# Patient Record
Sex: MALE | Race: OTHER
[De-identification: names, ages, dates, MRNs, and addresses within clinical notes are randomized per-mention and may not be internally consistent; named-entity substitution may affect disease eponyms.]

---

## 2020-09-29 ENCOUNTER — APPOINTMENT (OUTPATIENT)
Dept: GASTROENTEROLOGY | Facility: CLINIC | Age: 50
End: 2020-09-29

## 2020-11-24 ENCOUNTER — APPOINTMENT (OUTPATIENT)
Dept: GASTROENTEROLOGY | Facility: CLINIC | Age: 50
End: 2020-11-24
Payer: MEDICAID

## 2020-11-24 DIAGNOSIS — K21.9 GASTRO-ESOPHAGEAL REFLUX DISEASE W/OUT ESOPHAGITIS: ICD-10-CM

## 2020-11-24 DIAGNOSIS — Z78.9 OTHER SPECIFIED HEALTH STATUS: ICD-10-CM

## 2020-11-24 DIAGNOSIS — Z80.42 FAMILY HISTORY OF MALIGNANT NEOPLASM OF PROSTATE: ICD-10-CM

## 2020-11-24 DIAGNOSIS — K62.5 HEMORRHAGE OF ANUS AND RECTUM: ICD-10-CM

## 2020-11-24 DIAGNOSIS — Z12.11 ENCOUNTER FOR SCREENING FOR MALIGNANT NEOPLASM OF COLON: ICD-10-CM

## 2020-11-24 PROCEDURE — 99204 OFFICE O/P NEW MOD 45 MIN: CPT | Mod: 95

## 2020-11-24 RX ORDER — ESOMEPRAZOLE MAGNESIUM 40 MG/1
40 GRANULE, DELAYED RELEASE ORAL
Refills: 0 | Status: ACTIVE | COMMUNITY

## 2020-11-24 NOTE — ASSESSMENT
[FreeTextEntry1] : 50 year old male patient average risk for CRC, presents for CRC screening and reports as well occasional rectal bleeding over the last 1 year, with no changes in bowel habits, or weight loss. \par He reports occasional abdominal pain, chronic GERD for which he is on nexium for 15 years, with recurrence of heartburn if not on PPI for a day. \par \par GERD, never had EGD, on chronic PPI with failed taper trial\par needs EGD\par \par Average risk for CRC\par needs screening colonoscopy\par Risks and benefits discussed with patient.\par

## 2020-11-24 NOTE — PHYSICAL EXAM
[General Appearance - Alert] : alert [Hearing Threshold Finger Rub Not [place]] : hearing was normal [] : no respiratory distress [Oriented To Time, Place, And Person] : oriented to person, place, and time

## 2020-11-24 NOTE — HISTORY OF PRESENT ILLNESS
[Home] : at home, [unfilled] , at the time of the visit. [Medical Office: ([facility])___] : at the medical office located in  [Verbal consent obtained from patient] : the patient, [unfilled] [FreeTextEntry4] : Dilia Piper [de-identified] : 50 year old male patient average risk for CRC, presents for CRC screening and reports as well occasional rectal bleeding over the last 1 year, with no changes in bowel habits, or weight loss. \par He reports occasional abdominal pain, chronic GERD for which he is on nexium for 15 years, with recurrence of heartburn if not on PPI for a day.  Attending Only

## 2021-01-05 ENCOUNTER — OUTPATIENT (OUTPATIENT)
Dept: OUTPATIENT SERVICES | Facility: HOSPITAL | Age: 51
LOS: 1 days | Discharge: HOME | End: 2021-01-05

## 2021-01-05 ENCOUNTER — LABORATORY RESULT (OUTPATIENT)
Age: 51
End: 2021-01-05

## 2021-01-05 DIAGNOSIS — Z11.59 ENCOUNTER FOR SCREENING FOR OTHER VIRAL DISEASES: ICD-10-CM

## 2021-01-07 NOTE — ASU PATIENT PROFILE, ADULT - PSH
Encounter for screening colonoscopy  last colonoscopy approx 6 years ago per pt no significant findings

## 2021-01-08 ENCOUNTER — OUTPATIENT (OUTPATIENT)
Dept: OUTPATIENT SERVICES | Facility: HOSPITAL | Age: 51
LOS: 1 days | Discharge: HOME | End: 2021-01-08
Payer: MEDICAID

## 2021-01-08 ENCOUNTER — RESULT REVIEW (OUTPATIENT)
Age: 51
End: 2021-01-08

## 2021-01-08 ENCOUNTER — TRANSCRIPTION ENCOUNTER (OUTPATIENT)
Age: 51
End: 2021-01-08

## 2021-01-08 VITALS — SYSTOLIC BLOOD PRESSURE: 112 MMHG | RESPIRATION RATE: 17 BRPM | HEART RATE: 77 BPM | DIASTOLIC BLOOD PRESSURE: 68 MMHG

## 2021-01-08 VITALS
RESPIRATION RATE: 18 BRPM | DIASTOLIC BLOOD PRESSURE: 88 MMHG | SYSTOLIC BLOOD PRESSURE: 125 MMHG | HEART RATE: 57 BPM | HEIGHT: 70 IN | WEIGHT: 225.09 LBS | OXYGEN SATURATION: 98 % | TEMPERATURE: 98 F

## 2021-01-08 DIAGNOSIS — Z12.11 ENCOUNTER FOR SCREENING FOR MALIGNANT NEOPLASM OF COLON: Chronic | ICD-10-CM

## 2021-01-08 PROCEDURE — 45380 COLONOSCOPY AND BIOPSY: CPT

## 2021-01-08 PROCEDURE — 88312 SPECIAL STAINS GROUP 1: CPT | Mod: 26

## 2021-01-08 PROCEDURE — 88305 TISSUE EXAM BY PATHOLOGIST: CPT | Mod: 26

## 2021-01-08 PROCEDURE — 43239 EGD BIOPSY SINGLE/MULTIPLE: CPT | Mod: XS

## 2021-01-08 NOTE — H&P PST ADULT - HISTORY OF PRESENT ILLNESS
51 yo male patient average risk CRC presents CRC screening, rectal bleeding for over a year and recurrent GERD unresponsive to PPI Plan EGD/Colonoscopy

## 2021-01-08 NOTE — ASU DISCHARGE PLAN (ADULT/PEDIATRIC) - CALL YOUR DOCTOR IF YOU HAVE ANY OF THE FOLLOWING:
Bleeding that does not stop/Pain not relieved by Medications/Fever greater than (need to indicate Fahrenheit or Celsius)/Nausea and vomiting that does not stop/Excessive diarrhea/Inability to tolerate liquids or foods

## 2021-01-08 NOTE — CHART NOTE - NSCHARTNOTEFT_GEN_A_CORE
PACU ANESTHESIA ADMISSION NOTE      Procedure: EDG, Colonoscopy  Post op diagnosis:  Gastritis    ____  Intubated  TV:______       Rate: ______      FiO2: ______    __x__  Patent Airway    __x__  Full return of protective reflexes    __x__  Full recovery from anesthesia / back to baseline status    Vitals:  T(C): 36.4 (01-08-21 @ 14:28), Max: 36.4 (01-08-21 @ 12:39)  HR: 57 (01-08-21 @ 14:28) (57 - 57)  BP: 125/88 (01-08-21 @ 14:28) (125/88 - 125/88)  RR: 18 (01-08-21 @ 14:28) (18 - 18)  SpO2: 98% (01-08-21 @ 14:28) (98% - 98%)    Mental Status:  __x__ Awake   ___x__ Alert   _____ Drowsy   _____ Sedated    Nausea/Vomiting:  __x__ NO  ______Yes,   See Post - Op Orders          Pain Scale (0-10):  _0____    Treatment: ____ None    __x__ See Post - Op/PCA Orders    Post - Operative Fluids:   ____ Oral   __x__ See Post - Op Orders    Plan: Discharge:   ____Home       _____Floor     _____Critical Care    _____  Other:_________________    Comments: Patient had smooth intraoperative event, no anesthesia complication.  PACU Vital signs: HR:   65         BP:   118     /    78      RR:   18          O2 Sat: 97     %     Temp  36

## 2021-01-08 NOTE — ASU DISCHARGE PLAN (ADULT/PEDIATRIC) - FOLLOW UP APPOINTMENTS
[facility] [place]:  Ambulatory Surgery [facility]… 63 y/o F presents with L sided neck pain and L arm numbness intermittently x 1 week. Will obtain CT head/neck, labs, EKG and reassess.  Stroke not suspected clinically; no indication for tPA, code stroke or telestroke given intermittent numbness and onset 1 week ago.

## 2021-01-08 NOTE — ASU DISCHARGE PLAN (ADULT/PEDIATRIC) - CARE PROVIDER_API CALL
Rayna Ott (MD)  Gastroenterology  [address] [place] [address]  Phone: [phone]  Fax: [phone]  Follow Up Time:

## 2021-01-12 DIAGNOSIS — K62.5 HEMORRHAGE OF ANUS AND RECTUM: ICD-10-CM

## 2021-01-12 DIAGNOSIS — Z91.010 ALLERGY TO PEANUTS: ICD-10-CM

## 2021-01-12 DIAGNOSIS — K29.50 UNSPECIFIED CHRONIC GASTRITIS WITHOUT BLEEDING: ICD-10-CM

## 2021-01-12 DIAGNOSIS — K64.4 RESIDUAL HEMORRHOIDAL SKIN TAGS: ICD-10-CM

## 2021-01-12 DIAGNOSIS — K21.9 GASTRO-ESOPHAGEAL REFLUX DISEASE WITHOUT ESOPHAGITIS: ICD-10-CM

## 2021-01-12 DIAGNOSIS — K63.5 POLYP OF COLON: ICD-10-CM

## 2021-01-12 LAB
SURGICAL PATHOLOGY STUDY: SIGNIFICANT CHANGE UP
SURGICAL PATHOLOGY STUDY: SIGNIFICANT CHANGE UP

## 2021-10-08 PROBLEM — N20.0 CALCULUS OF KIDNEY: Chronic | Status: ACTIVE | Noted: 2021-01-08

## 2021-10-09 ENCOUNTER — EMERGENCY (EMERGENCY)
Facility: HOSPITAL | Age: 51
LOS: 0 days | Discharge: HOME | End: 2021-10-10
Attending: EMERGENCY MEDICINE | Admitting: EMERGENCY MEDICINE
Payer: MEDICAID

## 2021-10-09 VITALS
OXYGEN SATURATION: 97 % | HEIGHT: 70 IN | DIASTOLIC BLOOD PRESSURE: 99 MMHG | TEMPERATURE: 98 F | HEART RATE: 108 BPM | RESPIRATION RATE: 18 BRPM | SYSTOLIC BLOOD PRESSURE: 171 MMHG

## 2021-10-09 DIAGNOSIS — Z23 ENCOUNTER FOR IMMUNIZATION: ICD-10-CM

## 2021-10-09 DIAGNOSIS — Z12.11 ENCOUNTER FOR SCREENING FOR MALIGNANT NEOPLASM OF COLON: Chronic | ICD-10-CM

## 2021-10-09 DIAGNOSIS — Z91.018 ALLERGY TO OTHER FOODS: ICD-10-CM
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
## 2021-10-09 DIAGNOSIS — S67.194A CRUSHING INJURY OF RIGHT RING FINGER, INITIAL ENCOUNTER: ICD-10-CM
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
## 2021-10-09 DIAGNOSIS — Z91.010 ALLERGY TO PEANUTS: ICD-10-CM

## 2021-10-09 PROCEDURE — 12001 RPR S/N/AX/GEN/TRNK 2.5CM/<: CPT

## 2021-10-09 PROCEDURE — 73130 X-RAY EXAM OF HAND: CPT | Mod: 26,RT

## 2021-10-09 PROCEDURE — 99284 EMERGENCY DEPT VISIT MOD MDM: CPT | Mod: 25

## 2021-10-09 RX ORDER — KETOROLAC TROMETHAMINE 30 MG/ML
30 SYRINGE (ML) INJECTION ONCE
Refills: 0 | Status: DISCONTINUED | OUTPATIENT
Start: 2021-10-09 | End: 2021-10-09

## 2021-10-09 RX ORDER — TETANUS TOXOID, REDUCED DIPHTHERIA TOXOID AND ACELLULAR PERTUSSIS VACCINE, ADSORBED 5; 2.5; 8; 8; 2.5 [IU]/.5ML; [IU]/.5ML; UG/.5ML; UG/.5ML; UG/.5ML
0.5 SUSPENSION INTRAMUSCULAR ONCE
Refills: 0 | Status: COMPLETED | OUTPATIENT
Start: 2021-10-09 | End: 2021-10-09

## 2021-10-09 RX ADMIN — Medication 30 MILLIGRAM(S): at 22:40

## 2021-10-09 RX ADMIN — TETANUS TOXOID, REDUCED DIPHTHERIA TOXOID AND ACELLULAR PERTUSSIS VACCINE, ADSORBED 0.5 MILLILITER(S): 5; 2.5; 8; 8; 2.5 SUSPENSION INTRAMUSCULAR at 22:40

## 2021-10-10 RX ORDER — CEPHALEXIN 500 MG
1 CAPSULE ORAL
Qty: 28 | Refills: 0
Start: 2021-10-10 | End: 2021-10-16

## 2021-10-10 NOTE — ED PROVIDER NOTE - OBJECTIVE STATEMENT
49 yo m presenting w crush injury to R 4th finger. Accidentally slammed his finger in car door, +pain and active bleeding from finger. Denies any paresthesias. 51 yo m presenting w crush injury to R 4th finger. Accidentally slammed his finger in car door, +pain and bleeding from finger. Denies any paresthesias.

## 2021-10-10 NOTE — ED ADULT NURSE NOTE - OBJECTIVE STATEMENT
pt complains of right 4th finger injury, states he slammed his hand in car door by accident, able to move finger, denies numbness, tingling, lac to distal 4th in finger mother

## 2021-10-10 NOTE — ED PROVIDER NOTE - CARE PROVIDER_API CALL
Yves Deluna)  Orthopaedic Surgery  [address] [place] [address]  Phone: [phone]  Fax: [phone]  Follow Up Time: 1-3 Days

## 2021-10-10 NOTE — ED PROVIDER NOTE - PATIENT PORTAL LINK FT
You can access the FollowMyHealth Patient Portal offered by [facility] by registering at the following website: [email or web address]. By joining [employer]’s FollowMyHealth portal, you will also be able to view your health information using other applications (apps) compatible with our system.

## 2021-10-10 NOTE — ED PROVIDER NOTE - CLINICAL SUMMARY MEDICAL DECISION MAKING FREE TEXT BOX
pt presenting with crush injury to R 4th finger this evening- accidentally caught his finger in the car door. no numbness/focal weakness, no other acute injuries or complaints. no ac. 2+ equal pulses throughout <2sec capillary refill throughout +partial nail avulsion to R 4th finger, +ttp, +oozing, NVI. +ring on 4th finger. ring removed, finger blocked, irrigated, distal portion of nail removed, nailbed repaired, +tuft fx, wound dressed, finger splinted. Comfortable with discharge and follow-up outpatient, strict return precautions given. Endorses understanding of all of this and aware that they can return at any time for new or concerning symptoms. No further questions or concerns at this time

## 2021-10-10 NOTE — ED PROVIDER NOTE - PHYSICAL EXAMINATION
CONSTITUTIONAL: Well-developed; well-nourished; in no acute distress.   SKIN: warm, dry.  HEAD: Normocephalic; atraumatic.  EYES: PERRL, EOMI, no conjunctival erythema  ENT: No nasal discharge; airway clear. MMM.  MSK: Normal ROM.  No clubbing, cyanosis, or edema.  +partial nail avulsion to 4th digit of R hand w/ oozing of blood, +ttp of finger.  NEURO: Alert, oriented, grossly unremarkable.  PSYCH: Cooperative, appropriate.

## 2021-10-13 ENCOUNTER — LABORATORY RESULT (OUTPATIENT)
Age: 51
End: 2021-10-13

## 2021-10-14 ENCOUNTER — APPOINTMENT (OUTPATIENT)
Dept: UROLOGY | Facility: CLINIC | Age: 51
End: 2021-10-14
Payer: MEDICAID

## 2021-10-14 VITALS
HEART RATE: 75 BPM | TEMPERATURE: 98 F | HEIGHT: 70 IN | SYSTOLIC BLOOD PRESSURE: 145 MMHG | BODY MASS INDEX: 29.63 KG/M2 | WEIGHT: 207 LBS | DIASTOLIC BLOOD PRESSURE: 83 MMHG

## 2021-10-14 DIAGNOSIS — Z87.442 PERSONAL HISTORY OF URINARY CALCULI: ICD-10-CM

## 2021-10-14 PROCEDURE — 99204 OFFICE O/P NEW MOD 45 MIN: CPT

## 2021-10-14 RX ORDER — POLYETHYLENE GLYCOL 3350 AND ELECTROLYTES WITH LEMON FLAVOR 236; 22.74; 6.74; 5.86; 2.97 G/4L; G/4L; G/4L; G/4L; G/4L
236 POWDER, FOR SOLUTION ORAL
Qty: 1 | Refills: 0 | Status: COMPLETED | COMMUNITY
Start: 2020-11-24 | End: 2021-10-14

## 2021-10-14 RX ORDER — ALLOPURINOL 300 MG/1
300 TABLET ORAL
Qty: 30 | Refills: 0 | Status: COMPLETED | COMMUNITY
Start: 2020-11-05 | End: 2021-10-14

## 2021-10-14 NOTE — HISTORY OF PRESENT ILLNESS
[FreeTextEntry1] : Kamron is a 50-year-old male who I had seen back in 2008 give her take a year he had a left-sided stone that was treated with shockwave lithotripsy at that time he had had CAT scan showing other stones they were felt to be uric acid his pH was running between 6 and 7 though in 2001 he had calcium stones and when we did follow-up studies the KUB in May 2008 showed the stone so we went to lithotripsy.  His 24 urine at the time showed 2.2 L and I wanted him to see a nephrologist as diet was horrible with very high calciums even with allopurinol his uric acid was high he also had oxalate high sodium and high chloride.\par \par PTH at the time was 31.3 with a normal serum calcium and we had discussed he is eating too much animal protein along with a lot of green leafy vegetables so between his calcium is protein in his oxalate intake is 2.2 L a day was not enough I still wanted him to see nutritionist and a nephrologist which she did not do.  He had lithotripsy done on July 10 the fragments came back as calcium oxalate he then did not show for a few months there was some issues with his job and insurance he came back in November 2008 with a question of a lower pole right sided stone.  He came back in August 2009 he had lost 25 pounds was drinking a lot of water but the objective studies showed insufficient intake.  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  He was left with a double-J stent that was eventually removed he then did not do much of anything until about 2 months ago he had right-sided pain ended up passing a stone he gave that to his PCP who sent her for analysis and he tells me it came back calcium oxalate.  Saúl is still having occasional right-sided pain was sent a prescription for an ultrasound of the beginning of September but did not yet go decided he would like to see what I have to recommend.  He is single his diet is probably not the best he is not morbidly obese but he is not a slim as he was when I first met him and the question is what can we do what should we do\par \par He has a long-term relationship for 9 years they were living together at some point but they  about 2 years ago but are still getting together to be intimate on a regular basis in the last 2 months his morning erections are not what they used to be and when he is with her though she knows what he likes and she is a good partner his erection is not reliable on the most of the time he could get it up get it and get it off its not great he like to know what else we can do [3] : 3 [Right Flank] : right flank [Gradual] : gradual [___ Month(s) Ago] : [unfilled] month(s) ago [Intermittent] : intermittently [___ x Week] : occur [unfilled] times a week [___ Minute(s)] : last for [unfilled] minute(s) [Mild] : mild [Unchanged] : unchanged [None] : No relieving factors are noted

## 2021-10-14 NOTE — PHYSICAL EXAM
[General Appearance - Well Developed] : well developed [General Appearance - Well Nourished] : well nourished [Normal Appearance] : normal appearance [Well Groomed] : well groomed [General Appearance - In No Acute Distress] : no acute distress [Heart Rate And Rhythm] : Heart rate and rhythm were normal [Edema] : no peripheral edema [Respiration, Rhythm And Depth] : normal respiratory rhythm and effort [Exaggerated Use Of Accessory Muscles For Inspiration] : no accessory muscle use [Abdomen Soft] : soft [Abdomen Tenderness] : non-tender [Abdomen Hernia] : no hernia was discovered [Costovertebral Angle Tenderness] : no ~M costovertebral angle tenderness [Penis Abnormality] : normal circumcised penis [Epididymis] : the epididymides were normal [Testes Tenderness] : no tenderness of the testes [Testes Mass (___cm)] : there were no testicular masses [Normal Station and Gait] : the gait and station were normal for the patient's age [] : no rash [FreeTextEntry1] : Normal deep tendon reflexes [Oriented To Time, Place, And Person] : oriented to person, place, and time [Affect] : the affect was normal [Mood] : the mood was normal [Not Anxious] : not anxious [Inguinal Lymph Nodes Enlarged Bilaterally] : inguinal

## 2021-10-14 NOTE — ASSESSMENT
[FreeTextEntry1] : He has a history of stones numerous times over time with several procedures.  He recently passed a stone still has right renal colic the question is what is he have\par \par Though his primary care doctor recommended a sonogram him to recommend a CT stone protocol instead to that more definitive that would also help us see what the stones are made of if there are any stone site.  That would help plan any treatment it may or may not be necessary\par \par With respect to his erectile dysfunction he has mild testicular atrophy we will get some hormone studies to have to be drawn as a morning draw we will get a PSA as he is 50 and he will come back in for review.  He is very physically active has no cardiac issues so I do not think we need [facility]

## 2021-10-14 NOTE — LETTER BODY
[Dear  ___] : Dear  [unfilled], [Consult Letter:] : I had the pleasure of evaluating your patient, [unfilled]. [Please see my note below.] : Please see my note below. [Consult Closing:] : Thank you very much for allowing me to participate in the care of this patient.  If you have any questions, please do not hesitate to contact me. [Sincerely,] : Sincerely, [FreeTextEntry2] : Prince Hoyt, DO\par [address]\[place] [id number]

## 2021-10-14 NOTE — LETTER HEADER
[FreeTextEntry3] : Maria D Mariscal M.D.\par Director of Urology\par [facility]\par [address] [place] [address], [address]\[place] [address]

## 2021-10-15 LAB
APPEARANCE: ABNORMAL
BASOPHILS # BLD AUTO: 0.05 K/UL
BASOPHILS NFR BLD AUTO: 0.9 %
BILIRUBIN URINE: NEGATIVE
BLOOD URINE: NEGATIVE
COLOR: YELLOW
EOSINOPHIL # BLD AUTO: 0.17 K/UL
EOSINOPHIL NFR BLD AUTO: 3.1 %
GLUCOSE QUALITATIVE U: NEGATIVE
HCT VFR BLD CALC: 47.7 %
HGB BLD-MCNC: 16.6 G/DL
IMM GRANULOCYTES NFR BLD AUTO: 0.2 %
KETONES URINE: NEGATIVE
LEUKOCYTE ESTERASE URINE: NEGATIVE
LYMPHOCYTES # BLD AUTO: 1.6 K/UL
LYMPHOCYTES NFR BLD AUTO: 29 %
MAN DIFF?: NORMAL
MCHC RBC-ENTMCNC: 32.4 PG
MCHC RBC-ENTMCNC: 34.8 G/DL
MCV RBC AUTO: 93.2 FL
MONOCYTES # BLD AUTO: 0.57 K/UL
MONOCYTES NFR BLD AUTO: 10.3 %
NEUTROPHILS # BLD AUTO: 3.11 K/UL
NEUTROPHILS NFR BLD AUTO: 56.5 %
NITRITE URINE: NEGATIVE
PH URINE: 7.5
PLATELET # BLD AUTO: 221 K/UL
PROTEIN URINE: NEGATIVE
RBC # BLD: 5.12 M/UL
RBC # FLD: 11.4 %
SPECIFIC GRAVITY URINE: 1.02
UROBILINOGEN URINE: ABNORMAL
WBC # FLD AUTO: 5.51 K/UL

## 2021-10-18 LAB
ALBUMIN SERPL ELPH-MCNC: 4.4 G/DL
ALP BLD-CCNC: 107 U/L
ALT SERPL-CCNC: 45 U/L
ANION GAP SERPL CALC-SCNC: 13 MMOL/L
AST SERPL-CCNC: 29 U/L
BACTERIA UR CULT: NORMAL
BILIRUB DIRECT SERPL-MCNC: <0.2 MG/DL
BILIRUB INDIRECT SERPL-MCNC: >0.5 MG/DL
BILIRUB SERPL-MCNC: 0.7 MG/DL
BUN SERPL-MCNC: 14 MG/DL
CALCIUM SERPL-MCNC: 9.1 MG/DL
CHLORIDE SERPL-SCNC: 103 MMOL/L
CO2 SERPL-SCNC: 23 MMOL/L
CREAT SERPL-MCNC: 1.1 MG/DL
ESTRADIOL SERPL-MCNC: 12 PG/ML
GLUCOSE SERPL-MCNC: 104 MG/DL
LH SERPL-ACNC: 5.6 IU/L
POTASSIUM SERPL-SCNC: 4.3 MMOL/L
PROLACTIN SERPL-MCNC: 10.7 NG/ML
PROT SERPL-MCNC: 6.8 G/DL
PSA FREE FLD-MCNC: 33 %
PSA FREE SERPL-MCNC: 0.24 NG/ML
PSA SERPL-MCNC: 0.72 NG/ML
SODIUM SERPL-SCNC: 139 MMOL/L

## 2021-10-19 LAB
SHBG SERPL-SCNC: 30 NMOL/L
TESTOST BND SERPL-MCNC: 8.8 PG/ML
TESTOSTERONE TOTAL S: 369 NG/DL

## 2021-10-20 LAB
TESTOSTERONE FREE/WEAKLY BND: 93.2 NG/DL
TESTOSTERONE TOTAL S: 446 NG/DL
TESTOSTERONE, % FREE/WEAKLY BND: 20.9 %

## 2021-11-18 ENCOUNTER — APPOINTMENT (OUTPATIENT)
Dept: UROLOGY | Facility: CLINIC | Age: 51
End: 2021-11-18
Payer: MEDICAID

## 2021-11-18 PROCEDURE — 99214 OFFICE O/P EST MOD 30 MIN: CPT | Mod: 95

## 2021-11-18 RX ORDER — TADALAFIL 20 MG/1
20 TABLET ORAL
Qty: 10 | Refills: 3 | Status: ACTIVE | OUTPATIENT
Start: 2021-11-18

## 2021-11-18 NOTE — HISTORY OF PRESENT ILLNESS
[Medical Office: ([facility])___] : at the medical office located in  [Home] : at home, [unfilled] , at the time of the visit. 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 followed by ureteroscopy with eventual removal of the double-J\par \par 2 months ago he developed right-sided pain past the stone and it came back as calcium oxalate he still having occasional right-sided pain he was told to go for an ultrasound in September decided to wait and see what I find.\par \par In addition he and his long-term partner of 9 years  2 years ago but still get together on a somewhat regular basis in the last 2 months his erections were not what they used to be\par \par \par His exam at the time was relatively acceptable we sent him for a CAT scan and given the testicular atrophy morning bloods\par \par He is here to review the results\par \par \par \par [email or web address]\par \par Urine tests and blood results are in the chart. \par CT result in on the desk.

## 2021-11-18 NOTE — LETTER HEADER
[FreeTextEntry3] : Maria D Mariscal M.D.\par Director of Urology\par [facility]\par [address] [place] [address], [address]\[place] [address]

## 2021-11-18 NOTE — ASSESSMENT
[FreeTextEntry1] : With respect to his erectile dysfunction it is not hormonal and is up to him if he wants to try PDE 5 inhibitors we discussed the issues with him and he prefers to try Cialis\par \par With respect to his kidney stones they are small, the upper pole could probably pass on its own the lower pole on the right is going to bother him.  We will get a KUB and see if we can see it and if so consider shockwave lithotripsy.  He wants to know why he is making stones, we will wait until he has a stone free as we can make him and then arrange for metabolic work-up and then from that decide what we can do to help him out

## 2021-11-18 NOTE — LETTER BODY
[Dear  ___] : Dear  [unfilled], [Courtesy Letter:] : I had the pleasure of seeing your patient, [unfilled], in my office today. [Please see my note below.] : Please see my note below. [Sincerely,] : Sincerely, [FreeTextEntry2] : Prince Hoyt, DO\par [address]\[place] [phone]

## 2021-12-07 ENCOUNTER — OUTPATIENT (OUTPATIENT)
Dept: OUTPATIENT SERVICES | Facility: HOSPITAL | Age: 51
LOS: 1 days | Discharge: HOME | End: 2021-12-07
Payer: MEDICAID

## 2021-12-07 ENCOUNTER — RESULT REVIEW (OUTPATIENT)
Age: 51
End: 2021-12-07

## 2021-12-07 DIAGNOSIS — Z12.11 ENCOUNTER FOR SCREENING FOR MALIGNANT NEOPLASM OF COLON: Chronic | ICD-10-CM

## 2021-12-07 DIAGNOSIS — N20.0 CALCULUS OF KIDNEY: ICD-10-CM

## 2021-12-07 PROCEDURE — 74019 RADEX ABDOMEN 2 VIEWS: CPT | Mod: 26

## 2021-12-13 ENCOUNTER — APPOINTMENT (OUTPATIENT)
Dept: UROLOGY | Facility: CLINIC | Age: 51
End: 2021-12-13
Payer: MEDICAID

## 2021-12-13 DIAGNOSIS — N52.9 MALE ERECTILE DYSFUNCTION, UNSPECIFIED: ICD-10-CM

## 2021-12-13 PROCEDURE — 99215 OFFICE O/P EST HI 40 MIN: CPT | Mod: 95

## 2021-12-13 NOTE — PHYSICAL EXAM
[General Appearance - Well Developed] : well developed [General Appearance - Well Nourished] : well nourished [Normal Appearance] : normal appearance [Well Groomed] : well groomed [General Appearance - In No Acute Distress] : no acute distress [Respiration, Rhythm And Depth] : normal respiratory rhythm and effort [] : no respiratory distress [Exaggerated Use Of Accessory Muscles For Inspiration] : no accessory muscle use [Oriented To Time, Place, And Person] : oriented to person, place, and time [Affect] : the affect was normal [Mood] : the mood was normal [Not Anxious] : not anxious [Normal Station and Gait] : the gait and station were normal for the patient's age [FreeTextEntry1] : From what I can see doing video telemedicine

## 2021-12-13 NOTE — HISTORY OF PRESENT ILLNESS
[Home] : at home, [unfilled] , at the time of the visit. [Medical Office: ([facility])___] : at the medical office located in  [FreeTextEntry3] : he has received, reviewed and agreed to the telemedicine consent  no [FreeTextEntry1] : Saúl is a 51-year-old male born 1970 last seen by telemedicine in 11/18/2021.  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  He is here to check the results of the x-ray.  Again once we get him stone free metabolic work-up will be ordered\par \par He also has erectile dysfunction his hormones were normal and we put him on Cialis.  He is here to review the results he tells me that he lost some weight he is eating better and his pain is still not what it used to be is working better.  Again not as better as he like but he has not had time to get the Cialis he still plans on getting it and using it. [3] : 3 [Right Flank] : right flank [Gradual] : gradual [___ Month(s) Ago] : [unfilled] month(s) ago [Intermittent] : intermittently [___ x Week] : occur [unfilled] times a week [___ Minute(s)] : last for [unfilled] minute(s) [Mild] : mild [Unchanged] : unchanged [None] : No relieving factors are noted

## 2021-12-13 NOTE — ASSESSMENT
[FreeTextEntry1] : The right lower pole stone is visible.  I question whether I can see the upper pole stones however there is overlying stool.  We discussed the risk benefits I had rather do this with shockwave than ureteroscopy.  He understands the success rate at best is about 85% across his lower pole and he will have to do some positioning afterwards of the fragments floated up and down.  As far as the upper pole stone if we see it I will try and get it as well but no promises.\par \par I have recommended that he take some over-the-counter Dulcolax and/or mag citrate the evening before to try and get out as much stool as possible the more stool the last the vision the last addition the Lasix success.\par \par He understands he will need medical clearance as he is 51 years old\par \par As far as the Cialis when he tries it he will let me know how he is doing.\par \par Risk-benefit and alternatives to shockwave lithotripsy were discussed he is actually had this before understood the risks and is comfortable with the procedure

## 2021-12-13 NOTE — LETTER HEADER
[FreeTextEntry3] : Maria D Mariscal M.D.\par Director Emeritus of Urology\par [facility]\par [address] [place] [address], [address]\[place] [address]

## 2021-12-18 ENCOUNTER — LABORATORY RESULT (OUTPATIENT)
Age: 51
End: 2021-12-18

## 2021-12-21 RX ORDER — ALLOPURINOL 300 MG
1 TABLET ORAL
Qty: 0 | Refills: 0 | DISCHARGE

## 2021-12-29 ENCOUNTER — APPOINTMENT (OUTPATIENT)
Dept: UROLOGY | Facility: CLINIC | Age: 51
End: 2021-12-29
Payer: MEDICAID

## 2021-12-29 VITALS
BODY MASS INDEX: 29.06 KG/M2 | SYSTOLIC BLOOD PRESSURE: 134 MMHG | HEIGHT: 70 IN | DIASTOLIC BLOOD PRESSURE: 87 MMHG | HEART RATE: 66 BPM | WEIGHT: 203 LBS

## 2021-12-29 PROCEDURE — 99214 OFFICE O/P EST MOD 30 MIN: CPT

## 2021-12-29 NOTE — HISTORY OF PRESENT ILLNESS
[FreeTextEntry1] : Danay is a 51-year-old male born October 22, 1970 that was scheduled for right shockwave lithotripsy.  He seems to staff sent him a preoperative note which included not to take aspirin or aspirin-like compounds etc. but did not put a date and he just assumed it was however many days he wanted.  He also did not get the consent in urology because this was done as a telemedicine.  When he came in he had taken aspirin 5 days before we had to cancel.  He is here so we can make sure all the instructions are completely clear there are no more questions we do not have just problem again in the future

## 2021-12-29 NOTE — LETTER BODY
[Dear  ___] : Dear  [unfilled], [Courtesy Letter:] : I had the pleasure of seeing your patient, [unfilled], in my office today. [Please see my note below.] : Please see my note below. [Sincerely,] : Sincerely, [FreeTextEntry2] : Prince Hoyt, DO\par [address]\[place] [phone]

## 2021-12-29 NOTE — LETTER HEADER
[FreeTextEntry3] : Maria D Mariscal M.D.\par Director Emeritus of Urology\par [facility]\par [address] [place] [address], [address]\[place] [address]

## 2021-12-29 NOTE — ASSESSMENT
[FreeTextEntry1] : The problem I see was from both sides.  The staff did not give clear instructions, when he had instructions that were not clear he made assumptions, we accommodated him did not have him go through preadmission testing, and the doctor he went to is not a preadmission site that the physician so he did not give him the preop instructions.  It is something we will work on for the future.  For now I am going over all of the aspects n.p.o., nothing related to a nonsteroidal anti-inflammatory that includes failure Bufferin Motrin aspirin etc., n.p.o. over night, and he will need someone to drive him home as he will be getting anesthesia.  He also understands he has 2 stones lower and upper the lower is easily visible the upper is not I will try and get both with no promises.  Maximiliano and wanted to take some magnesium citrate the night before to move the stool out of the way so that we get a better vision if we can see it more clearly we can treat it more efficiently

## 2022-01-10 ENCOUNTER — RESULT REVIEW (OUTPATIENT)
Age: 52
End: 2022-01-10

## 2022-01-10 ENCOUNTER — OUTPATIENT (OUTPATIENT)
Dept: OUTPATIENT SERVICES | Facility: HOSPITAL | Age: 52
LOS: 1 days | Discharge: HOME | End: 2022-01-10
Payer: MEDICAID

## 2022-01-10 VITALS
TEMPERATURE: 97 F | HEIGHT: 70 IN | SYSTOLIC BLOOD PRESSURE: 139 MMHG | DIASTOLIC BLOOD PRESSURE: 78 MMHG | WEIGHT: 196.21 LBS | OXYGEN SATURATION: 97 % | HEART RATE: 60 BPM | RESPIRATION RATE: 18 BRPM

## 2022-01-10 DIAGNOSIS — Z98.890 OTHER SPECIFIED POSTPROCEDURAL STATES: Chronic | ICD-10-CM

## 2022-01-10 DIAGNOSIS — Z12.11 ENCOUNTER FOR SCREENING FOR MALIGNANT NEOPLASM OF COLON: Chronic | ICD-10-CM

## 2022-01-10 DIAGNOSIS — Z01.818 ENCOUNTER FOR OTHER PREPROCEDURAL EXAMINATION: ICD-10-CM

## 2022-01-10 DIAGNOSIS — N20.0 CALCULUS OF KIDNEY: ICD-10-CM

## 2022-01-10 LAB
ALBUMIN SERPL ELPH-MCNC: 4.3 G/DL — SIGNIFICANT CHANGE UP (ref 3.5–5.2)
ALP SERPL-CCNC: 131 U/L — HIGH (ref 30–115)
ALT FLD-CCNC: 30 U/L — SIGNIFICANT CHANGE UP (ref 0–41)
ANION GAP SERPL CALC-SCNC: 12 MMOL/L — SIGNIFICANT CHANGE UP (ref 7–14)
APPEARANCE UR: CLEAR — SIGNIFICANT CHANGE UP
APTT BLD: 32 SEC — SIGNIFICANT CHANGE UP (ref 27–39.2)
AST SERPL-CCNC: 24 U/L — SIGNIFICANT CHANGE UP (ref 0–41)
BASOPHILS # BLD AUTO: 0.03 K/UL — SIGNIFICANT CHANGE UP (ref 0–0.2)
BASOPHILS NFR BLD AUTO: 0.6 % — SIGNIFICANT CHANGE UP (ref 0–1)
BILIRUB SERPL-MCNC: 0.7 MG/DL — SIGNIFICANT CHANGE UP (ref 0.2–1.2)
BILIRUB UR-MCNC: NEGATIVE — SIGNIFICANT CHANGE UP
BUN SERPL-MCNC: 15 MG/DL — SIGNIFICANT CHANGE UP (ref 10–20)
CALCIUM SERPL-MCNC: 9.2 MG/DL — SIGNIFICANT CHANGE UP (ref 8.5–10.1)
CHLORIDE SERPL-SCNC: 104 MMOL/L — SIGNIFICANT CHANGE UP (ref 98–110)
CO2 SERPL-SCNC: 23 MMOL/L — SIGNIFICANT CHANGE UP (ref 17–32)
COLOR SPEC: SIGNIFICANT CHANGE UP
CREAT SERPL-MCNC: 1 MG/DL — SIGNIFICANT CHANGE UP (ref 0.7–1.5)
DIFF PNL FLD: NEGATIVE — SIGNIFICANT CHANGE UP
EOSINOPHIL # BLD AUTO: 0.08 K/UL — SIGNIFICANT CHANGE UP (ref 0–0.7)
EOSINOPHIL NFR BLD AUTO: 1.5 % — SIGNIFICANT CHANGE UP (ref 0–8)
GLUCOSE SERPL-MCNC: 98 MG/DL — SIGNIFICANT CHANGE UP (ref 70–99)
GLUCOSE UR QL: NEGATIVE — SIGNIFICANT CHANGE UP
HCT VFR BLD CALC: 50.2 % — SIGNIFICANT CHANGE UP (ref 42–52)
HGB BLD-MCNC: 17.2 G/DL — SIGNIFICANT CHANGE UP (ref 14–18)
IMM GRANULOCYTES NFR BLD AUTO: 0.2 % — SIGNIFICANT CHANGE UP (ref 0.1–0.3)
INR BLD: 0.89 RATIO — SIGNIFICANT CHANGE UP (ref 0.65–1.3)
KETONES UR-MCNC: NEGATIVE — SIGNIFICANT CHANGE UP
LEUKOCYTE ESTERASE UR-ACNC: NEGATIVE — SIGNIFICANT CHANGE UP
LYMPHOCYTES # BLD AUTO: 1.74 K/UL — SIGNIFICANT CHANGE UP (ref 1.2–3.4)
LYMPHOCYTES # BLD AUTO: 32.6 % — SIGNIFICANT CHANGE UP (ref 20.5–51.1)
MCHC RBC-ENTMCNC: 31.4 PG — HIGH (ref 27–31)
MCHC RBC-ENTMCNC: 34.3 G/DL — SIGNIFICANT CHANGE UP (ref 32–37)
MCV RBC AUTO: 91.8 FL — SIGNIFICANT CHANGE UP (ref 80–94)
MONOCYTES # BLD AUTO: 0.39 K/UL — SIGNIFICANT CHANGE UP (ref 0.1–0.6)
MONOCYTES NFR BLD AUTO: 7.3 % — SIGNIFICANT CHANGE UP (ref 1.7–9.3)
NEUTROPHILS # BLD AUTO: 3.08 K/UL — SIGNIFICANT CHANGE UP (ref 1.4–6.5)
NEUTROPHILS NFR BLD AUTO: 57.8 % — SIGNIFICANT CHANGE UP (ref 42.2–75.2)
NITRITE UR-MCNC: NEGATIVE — SIGNIFICANT CHANGE UP
NRBC # BLD: 0 /100 WBCS — SIGNIFICANT CHANGE UP (ref 0–0)
PH UR: 7.5 — SIGNIFICANT CHANGE UP (ref 5–8)
PLATELET # BLD AUTO: 232 K/UL — SIGNIFICANT CHANGE UP (ref 130–400)
POTASSIUM SERPL-MCNC: 4.4 MMOL/L — SIGNIFICANT CHANGE UP (ref 3.5–5)
POTASSIUM SERPL-SCNC: 4.4 MMOL/L — SIGNIFICANT CHANGE UP (ref 3.5–5)
PROT SERPL-MCNC: 6.7 G/DL — SIGNIFICANT CHANGE UP (ref 6–8)
PROT UR-MCNC: SIGNIFICANT CHANGE UP
PROTHROM AB SERPL-ACNC: 10.3 SEC — SIGNIFICANT CHANGE UP (ref 9.95–12.87)
RBC # BLD: 5.47 M/UL — SIGNIFICANT CHANGE UP (ref 4.7–6.1)
RBC # FLD: 11.4 % — LOW (ref 11.5–14.5)
SODIUM SERPL-SCNC: 139 MMOL/L — SIGNIFICANT CHANGE UP (ref 135–146)
SP GR SPEC: 1.02 — SIGNIFICANT CHANGE UP (ref 1.01–1.03)
UROBILINOGEN FLD QL: SIGNIFICANT CHANGE UP
WBC # BLD: 5.33 K/UL — SIGNIFICANT CHANGE UP (ref 4.8–10.8)
WBC # FLD AUTO: 5.33 K/UL — SIGNIFICANT CHANGE UP (ref 4.8–10.8)

## 2022-01-10 PROCEDURE — 93010 ELECTROCARDIOGRAM REPORT: CPT

## 2022-01-10 PROCEDURE — 71046 X-RAY EXAM CHEST 2 VIEWS: CPT | Mod: 26

## 2022-01-10 RX ORDER — OMEPRAZOLE 10 MG/1
0 CAPSULE, DELAYED RELEASE ORAL
Qty: 0 | Refills: 0 | DISCHARGE

## 2022-01-10 NOTE — H&P PST ADULT - NSICDXPASTMEDICALHX_GEN_ALL_CORE_FT
Order written   PAST MEDICAL HISTORY:  Former smoker     GERD (gastroesophageal reflux disease)     Kidney stones

## 2022-01-10 NOTE — H&P PST ADULT - HISTORY OF PRESENT ILLNESS
Pt denies cp palp uri cough dysuria or sob. ET: 1 FOS- denies SOB . PT denies any open wounds, drainage or rashes. denies hx of covid-denies recent cough fever or infection. aware to self quaerantine preop. all instructions reviewed.  scheduled for 1/18 procedure right ESWL . hx renal stones recurrent    pt has hx recurrent renal stones and prior lithotripsy procedures. hx stones x many years  x 20 years    increasing pain x last 4 weeks right sided renal area.  pain scale 4/10 recently. denies pain meds  pain is sharp/burning pain right lower back . alleviates at times prn positional    As per patient, this is their complete medical and surgical history, including medications both prescribed or over the counter.    Anesthesia Alert  yes-  Difficult Airway  IV  NO--History of neck surgery or radiation  NO--Limited ROM of neck  NO--History of Malignant hyperthermia  NO--Personal or family history of Pseudocholinesterase deficiency.  NO--Prior Anesthesia Complication  NO--Latex Allergy  NO--Loose teeth  NO--History of Rheumatoid Arthritis  UNKNOWN --LUKE  ADMOTS TO SNORING-DENIES W/U  NO--Bleeding risk  NO--Other_____    Patient verbalized understanding of instructions and was given the opportunity to ask questions and have them answered.  Patient denies any signs or symptoms of COVID 19 and denies contact with known positive individuals.  They have an appointment for repeat COVID testing pre-procedure and acknowledge its time and place.  They were instructed to quarantine pre-procedure, practice exposure control measures, continue to self-monitor and report any concerns to their proceduralist.   Pt denies cp palp uri cough dysuria or sob. ET: 1 FOS- denies SOB . PT denies any open wounds, drainage or rashes. denies hx of covid-denies recent cough fever or infection. aware to self quaerantine preop. all instructions reviewed.  scheduled for 1/18 procedure right ESWL . hx renal stones recurrent    pt has hx recurrent renal stones and prior lithotripsy procedures. hx stones x many years  x 20 years    increasing pain x last 4 weeks right sided renal area.  pain scale 4/10 recently. denies pain meds  pain is sharp/burning pain right lower back . alleviates at times prn positional    As per patient, this is their complete medical and surgical history, including medications both prescribed or over the counter.    Anesthesia Alert  yes-  Difficult Airway  IV  NO--History of neck surgery or radiation  NO--Limited ROM of neck  NO--History of Malignant hyperthermia  NO--Personal or family history of Pseudocholinesterase deficiency.  NO--Prior Anesthesia Complication  NO--Latex Allergy  NO--Loose teeth  NO--History of Rheumatoid Arthritis  UNKNOWN --LUKE  ADMOTS TO SNORING-DENIES W/U  NO--Bleeding risk      Patient verbalized understanding of instructions and was given the opportunity to ask questions and have them answered.  Patient denies any signs or symptoms of COVID 19 and denies contact with known positive individuals.  They have an appointment for repeat COVID testing pre-procedure and acknowledge its time and place.  They were instructed to quarantine pre-procedure, practice exposure control measures, continue to self-monitor and report any concerns to their proceduralist.

## 2022-01-11 LAB
CULTURE RESULTS: NO GROWTH — SIGNIFICANT CHANGE UP
SPECIMEN SOURCE: SIGNIFICANT CHANGE UP

## 2022-01-15 ENCOUNTER — LABORATORY RESULT (OUTPATIENT)
Age: 52
End: 2022-01-15

## 2022-01-18 ENCOUNTER — OUTPATIENT (OUTPATIENT)
Dept: OUTPATIENT SERVICES | Facility: HOSPITAL | Age: 52
LOS: 1 days | Discharge: HOME | End: 2022-01-18
Payer: MEDICAID

## 2022-01-18 ENCOUNTER — RESULT REVIEW (OUTPATIENT)
Age: 52
End: 2022-01-18

## 2022-01-18 ENCOUNTER — APPOINTMENT (OUTPATIENT)
Dept: UROLOGY | Facility: HOSPITAL | Age: 52
End: 2022-01-18

## 2022-01-18 VITALS
WEIGHT: 199.96 LBS | HEART RATE: 59 BPM | OXYGEN SATURATION: 98 % | TEMPERATURE: 97 F | DIASTOLIC BLOOD PRESSURE: 79 MMHG | HEIGHT: 70 IN | RESPIRATION RATE: 18 BRPM | SYSTOLIC BLOOD PRESSURE: 115 MMHG

## 2022-01-18 VITALS — SYSTOLIC BLOOD PRESSURE: 119 MMHG | DIASTOLIC BLOOD PRESSURE: 76 MMHG | HEART RATE: 50 BPM

## 2022-01-18 DIAGNOSIS — Z98.890 OTHER SPECIFIED POSTPROCEDURAL STATES: Chronic | ICD-10-CM

## 2022-01-18 PROCEDURE — 50590 FRAGMENTING OF KIDNEY STONE: CPT | Mod: RT

## 2022-01-18 PROCEDURE — 74018 RADEX ABDOMEN 1 VIEW: CPT | Mod: 26

## 2022-01-18 RX ORDER — HYDROMORPHONE HYDROCHLORIDE 2 MG/ML
0.5 INJECTION INTRAMUSCULAR; INTRAVENOUS; SUBCUTANEOUS ONCE
Refills: 0 | Status: DISCONTINUED | OUTPATIENT
Start: 2022-01-18 | End: 2022-01-18

## 2022-01-18 RX ORDER — SODIUM CHLORIDE 9 MG/ML
1000 INJECTION, SOLUTION INTRAVENOUS
Refills: 0 | Status: DISCONTINUED | OUTPATIENT
Start: 2022-01-18 | End: 2022-01-18

## 2022-01-18 RX ADMIN — SODIUM CHLORIDE 75 MILLILITER(S): 9 INJECTION, SOLUTION INTRAVENOUS at 13:53

## 2022-01-18 NOTE — BRIEF OPERATIVE NOTE - OPERATION/FINDINGS
good change  bars 1-7   rate 60  shocks 50 at bars 1-6, then 1700 at & stone no longer seen so we stopped

## 2022-01-18 NOTE — ASU PATIENT PROFILE, ADULT - NSICDXPASTMEDICALHX_GEN_ALL_CORE_FT
PAST MEDICAL HISTORY:  Former smoker     GERD (gastroesophageal reflux disease)     Kidney stones

## 2022-01-18 NOTE — ASU PATIENT PROFILE, ADULT - VISION (WITH CORRECTIVE LENSES IF THE PATIENT USUALLY WEARS THEM):
Speaking Coherently
Normal vision: sees adequately in most situations; can see medication labels, newsprint

## 2022-01-18 NOTE — BRIEF OPERATIVE NOTE - PRIMARY SURGEON
LEG LENGTH STUDY (WHOLE LEG)



CLINICAL HISTORY: ABNORMAL GAIN, LUMBAR SPINAL STENOSIS    



COMPARISON STUDY:  No previous studies for comparison.



FINDINGS: There are postsurgical changes of a posterior lumbar spinal fusion.

There are bilateral sacral bolts present. There are bilateral total hip

arthroplasties. The right lower extremity from the top of the acetabular cup to

the tibial plafond measures 869 mm. The left lower extremity from the top of the

acetabular cup the tibial plafond measures 867 mm.



IMPRESSION:  

1. Postsurgical changes involve the spine and hips

2. Leg length discrepancy of less than 2 mm. 









Electronically signed by:  Todd Hernandez M.D.

1/2/2017 4:01 PM
[email or web address]

## 2022-01-18 NOTE — ASU DISCHARGE PLAN (ADULT/PEDIATRIC) - NS MD DC FALL RISK RISK
For information on Fall & Injury Prevention, visit: [email or web address] OR  [email or web address] OR  https://www.cdc.gov/steadi/patient.html

## 2022-01-18 NOTE — ASU DISCHARGE PLAN (ADULT/PEDIATRIC) - CARE PROVIDER_API CALL
Maria D Mariscal)  Urology  [address] [place] [address] [address] [place] [address]  Phone: [phone]  Fax: [phone]  Established Patient  Follow Up Time:

## 2022-01-18 NOTE — ASU PATIENT PROFILE, ADULT - FALL HARM RISK - HARM RISK INTERVENTIONS

## 2022-01-18 NOTE — CHART NOTE - NSCHARTNOTEFT_GEN_A_CORE
PACU ANESTHESIA ADMISSION NOTE      Procedure: ESWL, kidney, right      Post op diagnosis:  Right renal stone        ____  Intubated  TV:______       Rate: ______      FiO2: ______    __x__  Patent Airway    __x__  Full return of protective reflexes    __x__  Full recovery from anesthesia / back to baseline     Vitals:   T: 36.2          R:   12               BP:    112/74              Sat:  98                 P: 65      Mental Status:  _x___ Awake   ___x__ Alert   _____ Drowsy   _____ Sedated    Nausea/Vomiting:  __x__ NO  ______Yes,   See Post - Op Orders          Pain Scale (0-10):  __x___    Treatment: ____ None    ____ See Post - Op/PCA Orders    Post - Operative Fluids:   ____ Oral   __x__ See Post - Op Orders    Plan: Discharge:   __x__Home       _____Floor     _____Critical Care    _____  Other:_________________    Comments: tolerated procedure well

## 2022-01-20 DIAGNOSIS — N20.0 CALCULUS OF KIDNEY: ICD-10-CM

## 2022-01-20 DIAGNOSIS — Z91.010 ALLERGY TO PEANUTS: ICD-10-CM

## 2022-01-20 DIAGNOSIS — K21.9 GASTRO-ESOPHAGEAL REFLUX DISEASE WITHOUT ESOPHAGITIS: ICD-10-CM

## 2022-01-20 DIAGNOSIS — Z87.891 PERSONAL HISTORY OF NICOTINE DEPENDENCE: ICD-10-CM

## 2022-02-07 PROBLEM — Z87.891 PERSONAL HISTORY OF NICOTINE DEPENDENCE: Chronic | Status: ACTIVE | Noted: 2022-01-10

## 2022-02-07 PROBLEM — K21.9 GASTRO-ESOPHAGEAL REFLUX DISEASE WITHOUT ESOPHAGITIS: Chronic | Status: ACTIVE | Noted: 2022-01-10

## 2022-02-09 NOTE — ASU PREOP CHECKLIST - STERILIZATION AFFIRMATION
n/a Banner Transposition Flap Text: The defect edges were debeveled with a #15 scalpel blade.  Given the location of the defect and the proximity to free margins a Banner transposition flap was deemed most appropriate.  Using a sterile surgical marker, an appropriate flap drawn around the defect. The area thus outlined was incised deep to adipose tissue with a #15 scalpel blade.  The skin margins were undermined to an appropriate distance in all directions utilizing iris scissors.

## 2022-02-23 ENCOUNTER — OUTPATIENT (OUTPATIENT)
Dept: OUTPATIENT SERVICES | Facility: HOSPITAL | Age: 52
LOS: 1 days | Discharge: HOME | End: 2022-02-23
Payer: MEDICAID

## 2022-02-23 ENCOUNTER — RESULT REVIEW (OUTPATIENT)
Age: 52
End: 2022-02-23

## 2022-02-23 DIAGNOSIS — N20.0 CALCULUS OF KIDNEY: ICD-10-CM

## 2022-02-23 DIAGNOSIS — Z98.890 OTHER SPECIFIED POSTPROCEDURAL STATES: Chronic | ICD-10-CM

## 2022-02-23 DIAGNOSIS — R10.9 UNSPECIFIED ABDOMINAL PAIN: ICD-10-CM

## 2022-02-23 PROCEDURE — 74019 RADEX ABDOMEN 2 VIEWS: CPT | Mod: 26

## 2022-03-01 ENCOUNTER — RESULT REVIEW (OUTPATIENT)
Age: 52
End: 2022-03-01

## 2022-03-01 ENCOUNTER — APPOINTMENT (OUTPATIENT)
Dept: UROLOGY | Facility: CLINIC | Age: 52
End: 2022-03-01
Payer: MEDICAID

## 2022-03-01 VITALS
HEIGHT: 70 IN | HEART RATE: 58 BPM | TEMPERATURE: 98.1 F | DIASTOLIC BLOOD PRESSURE: 81 MMHG | WEIGHT: 195 LBS | BODY MASS INDEX: 27.92 KG/M2 | SYSTOLIC BLOOD PRESSURE: 127 MMHG

## 2022-03-01 DIAGNOSIS — Z00.00 ENCOUNTER FOR GENERAL ADULT MEDICAL EXAMINATION W/OUT ABNORMAL FINDINGS: ICD-10-CM

## 2022-03-01 DIAGNOSIS — N23 UNSPECIFIED RENAL COLIC: ICD-10-CM

## 2022-03-01 PROCEDURE — 99214 OFFICE O/P EST MOD 30 MIN: CPT | Mod: 24

## 2022-03-01 NOTE — ASSESSMENT
[FreeTextEntry1] : I do not believe his current pain is due to the remaining fragment of stone but is too small it is possible it is was residual from the shockwave lithotripsy causing trauma to the flank.  He has not felt that in a week and a myochromic to worry about it for now.\par \par The question is how we get rid of that remaining flocculus if you have a stone he can grow.  He can do handstands he can actually or could actually break that so he could shake himself up and down while he is in a vertical position when trying to take that stone free and then when he gets down lying on his left side for a few minutes so as the stone drifts down it can drift into the pelvis and out it may help.\par \par The next question is how to we prevent future stones\par \par What we will do is wait about 6 weeks get a metabolic work-up we will get a repeat imaging study this time getting an ultrasound.  We do not see any stones whether he catches it or not, do not worry we will see what the metabolic work-up shows and then see what we can do to help prevent future stones.  Depending on the findings we might repeat the metabolic work-up after behavior and/or dietary modification.\par \par We did discuss methods of stone prevention including but not limited to hydration, at least 2-1/2 L of urine output per day, limited animal protein, being careful with dairy so as not to take too much calcium and salt which causes calcium excretion.  Salads are good but too much green leafy vegetables or other products such as chocolate or green tea that are rich in oxalate promotes stones.  He needs to increase inhibitor such as citrate potassium and magnesium.  We will give him booklets on this, and specifically discussed\par \par Diet modification for stone includes:\par \par Increasing fluid intake to produce 2 to 2.5 liters of urine per day (approx 3 liters intake), and should be primarily water. \par \par Calcium intake should be approximately 1000 mg per day. \par \par Oxalate intake should be reduced- most common sources in diet which have very high levels include peanuts/nuts, tea, coffee, chocolate, spinach, beets, rhubarb, swiss chard. I've also given/directed to a list with other high oxalate containing foods.\par  \par Animal flesh protein should be controlled- approx 4 to 6 ounces per day, with some vegetarian days included in the week. Studies have shown that vegetarians have half the risk of stones of people who eat only 4 ounces per day of meat or fish of any kind (beef, chicken, fish, shellfish, pork, etc), indicating that a vegetarian lifestyle can decrease future stone risks.\par \par Finally, salt intake should be reduced as high levels in the diet will increase urinary calcium. <2400 mg per day on a low sodium diet is strongly recommended.\par \par Citrate is a benefit; tiara and limes with most citrate and least sugar- recommend 'a lemon or lime a day'; easiest with concentrate, mixed into water or other beverages.\par \par www.litholink.com ---> in the lower left column there is a link for "diet resources"\par  \par He will go to the [employer] page listed above and review things for himself and we will see him back after the metabolic work-up

## 2022-03-01 NOTE — HISTORY OF PRESENT ILLNESS
[1] : 1 [Sharp] : sharp [Intermittent] : intermittently [Mild] : mild [Resolved] : resolved [None] : No relieving factors are noted [FreeTextEntry1] : Saúl is a 51-year-old  male born October 22, 1970 who had shockwave lithotripsy of a right lower pole stone on January 18, 2022.  The time of the procedure we had good breakdown he has been straining his urine as he said sometimes, brought in a bunch of fragments not nearly as much as I expect.  KUB was done on February 23rd and he is here for review.  Please note he saw he passed out a lot more fragments they were just at work and he could not strain while he was at work (???)\par \par He tells me he still has occasional right-sided flank pain similar to what he had before surgery that we thought might be due to the stone [de-identified] : Right flank about the posterior axillary line below the 12th rib [de-identified] : This pain existed before the shockwave lithotripsy [de-identified] : He had it once last week now it does not bother him [de-identified] : It was transitory

## 2022-03-01 NOTE — ASSESSMENT
[FreeTextEntry1] : I do not believe his current pain is due to the remaining fragment of stone but is too small it is possible it is was residual from the shockwave lithotripsy causing trauma to the flank.  He has not felt that in a week and a myochromic to worry about it for now.\par \par The question is how we get rid of that remaining flocculus if you have a stone he can grow.  He can do handstands he can actually or could actually break that so he could shake himself up and down while he is in a vertical position when trying to take that stone free and then when he gets down lying on his left side for a few minutes so as the stone drifts down it can drift into the pelvis and out it may help.\par \par The next question is how to we prevent future stones\par \par What we will do is wait about 6 weeks get a metabolic work-up we will get a repeat imaging study this time getting an ultrasound.  We do not see any stones whether he catches it or not, do not worry we will see what the metabolic work-up shows and then see what we can do to help prevent future stones.  Depending on the findings we might repeat the metabolic work-up after behavior and/or dietary modification.\par \par We did discuss methods of stone prevention including but not limited to hydration, at least 2-1/2 L of urine output per day, limited animal protein, being careful with dairy so as not to take too much calcium and salt which causes calcium excretion.  Salads are good but too much green leafy vegetables or other products such as chocolate or green tea that are rich in oxalate promotes stones.  He needs to increase inhibitor such as citrate potassium and magnesium.  We will give him booklets on this, and specifically discussed\par \par Diet modification for stone includes:\par \par Increasing fluid intake to produce 2 to 2.5 liters of urine per day (approx 3 liters intake), and should be primarily water. \par \par Calcium intake should be approximately 1000 mg per day. \par \par Oxalate intake should be reduced- most common sources in diet which have very high levels include peanuts/nuts, tea, coffee, chocolate, spinach, beets, rhubarb, swiss chard. I've also given/directed to a list with other high oxalate containing foods.\par  \par Animal flesh protein should be controlled- approx 4 to 6 ounces per day, with some vegetarian days included in the week. Studies have shown that vegetarians have half the risk of stones of people who eat only 4 ounces per day of meat or fish of any kind (beef, chicken, fish, shellfish, pork, etc), indicating that a vegetarian lifestyle can decrease future stone risks.\par \par Finally, salt intake should be reduced as high levels in the diet will increase urinary calcium. <2400 mg per day on a low sodium diet is strongly recommended.\par \par Citrate is a benefit; tiara and limes with most citrate and least sugar- recommend 'a lemon or lime a day'; easiest with concentrate, mixed into water or other beverages.\par \par www.litholink.com ---> in the lower left column there is a link for "diet resources"\par  \par He will go to the [employer] page listed above and review things for himself and we will see him back after the metabolic work-up

## 2022-03-01 NOTE — LETTER HEADER
[FreeTextEntry3] : Maria D Mariscal M.D.\par Director Emeritus of Urology\par [facility]\par [address] [place] [address], [address]\[place] [address]

## 2022-03-01 NOTE — LETTER BODY
[Dear  ___] : Dear  [unfilled], [Courtesy Letter:] : I had the pleasure of seeing your patient, [unfilled], in my office today. [Please see my note below.] : Please see my note below. [Sincerely,] : Sincerely, [FreeTextEntry2] : Prince Hoyt, DO\par [address]\[place] [id number]

## 2022-03-01 NOTE — LETTER BODY
[Dear  ___] : Dear  [unfilled], [Courtesy Letter:] : I had the pleasure of seeing your patient, [unfilled], in my office today. [Please see my note below.] : Please see my note below. [Sincerely,] : Sincerely, [FreeTextEntry2] : Prince Hoyt, DO\par [address]\[place] [phone]

## 2022-03-01 NOTE — LETTER HEADER
[FreeTextEntry3] : Maria D Mariscal M.D.\par Director Emeritus of Urology\par [facility]\par [address] [place] [address], [address]\[place] [address]

## 2022-03-01 NOTE — HISTORY OF PRESENT ILLNESS
[1] : 1 [Sharp] : sharp [Intermittent] : intermittently [Mild] : mild [Resolved] : resolved [None] : No relieving factors are noted [FreeTextEntry1] : Saúl is a 51-year-old  male born October 22, 1970 who had shockwave lithotripsy of a right lower pole stone on January 18, 2022.  The time of the procedure we had good breakdown he has been straining his urine as he said sometimes, brought in a bunch of fragments not nearly as much as I expect.  KUB was done on February 23rd and he is here for review.  Please note he saw he passed out a lot more fragments they were just at work and he could not strain while he was at work (???)\par \par He tells me he still has occasional right-sided flank pain similar to what he had before surgery that we thought might be due to the stone [de-identified] : Right flank about the posterior axillary line below the 12th rib [de-identified] : This pain existed before the shockwave lithotripsy [de-identified] : He had it once last week now it does not bother him [de-identified] : It was transitory

## 2022-03-07 LAB — NIDUS STONE QN: NORMAL

## 2022-03-15 ENCOUNTER — OUTPATIENT (OUTPATIENT)
Dept: OUTPATIENT SERVICES | Facility: HOSPITAL | Age: 52
LOS: 1 days | Discharge: HOME | End: 2022-03-15
Payer: MEDICAID

## 2022-03-15 DIAGNOSIS — Z98.890 OTHER SPECIFIED POSTPROCEDURAL STATES: Chronic | ICD-10-CM

## 2022-03-15 DIAGNOSIS — Z00.00 ENCOUNTER FOR GENERAL ADULT MEDICAL EXAMINATION WITHOUT ABNORMAL FINDINGS: ICD-10-CM

## 2022-03-15 PROCEDURE — 76775 US EXAM ABDO BACK WALL LIM: CPT | Mod: 26

## 2022-05-03 ENCOUNTER — RESULT REVIEW (OUTPATIENT)
Age: 52
End: 2022-05-03

## 2022-05-03 ENCOUNTER — APPOINTMENT (OUTPATIENT)
Dept: UROLOGY | Facility: CLINIC | Age: 52
End: 2022-05-03
Payer: MEDICAID

## 2022-05-03 VITALS
TEMPERATURE: 98 F | DIASTOLIC BLOOD PRESSURE: 64 MMHG | HEART RATE: 66 BPM | HEIGHT: 70 IN | WEIGHT: 195 LBS | SYSTOLIC BLOOD PRESSURE: 115 MMHG | BODY MASS INDEX: 27.92 KG/M2

## 2022-05-03 DIAGNOSIS — Z00.00 ENCOUNTER FOR GENERAL ADULT MEDICAL EXAMINATION W/OUT ABNORMAL FINDINGS: ICD-10-CM

## 2022-05-03 PROCEDURE — 99215 OFFICE O/P EST HI 40 MIN: CPT | Mod: 24

## 2022-05-03 RX ORDER — TRAMADOL HYDROCHLORIDE 50 MG/1
50 TABLET, COATED ORAL
Qty: 20 | Refills: 0 | Status: COMPLETED | COMMUNITY
Start: 2021-12-29 | End: 2022-05-03

## 2022-05-03 NOTE — ASSESSMENT
[FreeTextEntry1] : At his last visit we discussed stone prevention in general and pretty much everything we said he should try and avoid except for volume which is still good and citrate which is acceptable he is gone and though he may have improved but not good enough.\par \par Am going to recommend he see a nephrologist because with these numbers I do not know if that 4 mm stone is a residual or is actually forming a new stone already.  The KUB January 18 had a question of a 2 mm stone in the ultrasound shows 1 that 4 mm.  2 cubit is a for acute is 64 which means an increase in volume of 800%\par \par Stone analysis was calcium oxalate monohydrate stone which was a 4 mm stone back in February we should have seen it so I am comfortable saying there is a good chance this is already new growth.\par \par As far as the Cialis he is 51 years old and it would help him go more than once per night that be a gift but were not going to go to anything further for example injections which only 1 surgery.  He will stay with the Cialis for what ever bit it does give him pain when it stops working we will reevaluate

## 2022-05-03 NOTE — HISTORY OF PRESENT ILLNESS
[None] : no symptoms [FreeTextEntry1] : Saúl is a 51-year-old male born October 22, 1970 last seen on March 1, 2022 after he had shockwave lithotripsy of a right lower pole stone January 18, 2022 he had passed a lot of fragments only brought in some of them\par \par We had arranged for stone analysis, metabolic work-up, renal ultrasound and is here to review.\par \par The stone analysis was done March 1, 2022\par Metabolic work-up March 14 and 15, both weekdays back to back instead of one weekday and 1 weekend\par The ultrasound was done March 15, 2022\par \par He is taking Cialis at the 20 mg dose so he can go more than once per night.  The first time he really does not need the Cialis and its not really giving him a shorter recycling time.

## 2022-05-03 NOTE — LETTER HEADER
[FreeTextEntry3] : Maria D Mariscal M.D.\par Director Emeritus of Urology\par [facility]/[place]\par [address] [place] [address], [address]\[place] [address]

## 2022-05-03 NOTE — LETTER BODY
[Dear  ___] : Dear  [unfilled], [Courtesy Letter:] : I had the pleasure of seeing your patient, [unfilled], in my office today. [Please see my note below.] : Please see my note below. [Sincerely,] : Sincerely, [FreeTextEntry2] : Prince Hoyt, DO\par [address]\[place] [id number]

## 2022-07-18 ENCOUNTER — OUTPATIENT (OUTPATIENT)
Dept: OUTPATIENT SERVICES | Facility: HOSPITAL | Age: 52
LOS: 1 days | Discharge: HOME | End: 2022-07-18

## 2022-07-18 DIAGNOSIS — Z98.890 OTHER SPECIFIED POSTPROCEDURAL STATES: Chronic | ICD-10-CM

## 2022-07-18 DIAGNOSIS — N20.0 CALCULUS OF KIDNEY: ICD-10-CM

## 2022-07-18 PROCEDURE — 74019 RADEX ABDOMEN 2 VIEWS: CPT | Mod: 26

## 2022-07-19 ENCOUNTER — OUTPATIENT (OUTPATIENT)
Dept: OUTPATIENT SERVICES | Facility: HOSPITAL | Age: 52
LOS: 1 days | Discharge: HOME | End: 2022-07-19

## 2022-07-19 DIAGNOSIS — Z98.890 OTHER SPECIFIED POSTPROCEDURAL STATES: Chronic | ICD-10-CM

## 2022-07-19 DIAGNOSIS — N20.0 CALCULUS OF KIDNEY: ICD-10-CM

## 2022-07-19 PROCEDURE — 76775 US EXAM ABDO BACK WALL LIM: CPT | Mod: 26

## 2022-08-17 ENCOUNTER — APPOINTMENT (OUTPATIENT)
Dept: UROLOGY | Facility: CLINIC | Age: 52
End: 2022-08-17

## 2022-11-21 ENCOUNTER — APPOINTMENT (OUTPATIENT)
Dept: UROLOGY | Facility: CLINIC | Age: 52
End: 2022-11-21

## 2022-11-21 PROCEDURE — 99214 OFFICE O/P EST MOD 30 MIN: CPT | Mod: 95

## 2022-11-21 NOTE — ASSESSMENT
[FreeTextEntry1] : He tells me that the nephrologist did not care about his calcium, oxalate and uric acid which does not make any sense I will send her a task or an email and see if there was a misunderstanding and ask her to contact him for further education.\par \par As far as the erections he is not happy with the Cialis and is not sure where he wants to go.  I suggested he google intracavernosal injection and if he is interested in considering that we can either teach him straight out or if he would like to see what it is like and make sure he would respond before he starts doing it at home we could arrange for a Doppler study which will both tell me if the problem is vascular emotional or combination of the above and he could see what injections are like.\par \par Finally as far as the remaining stone it is not grown we will wait and watch and see if the nephrologist can optimize his metabolic work-up and then decide if we have to treat it or we can continue to observe\par \par

## 2022-11-21 NOTE — LETTER BODY
[Dear  ___] : Dear  [unfilled], [Courtesy Letter:] : I had the pleasure of seeing your patient, [unfilled], in my office today. [Please see my note below.] : Please see my note below. [Sincerely,] : Sincerely, [FreeTextEntry2] : Prince Hoyt, DO\par [address]\[place] [phone]

## 2022-11-21 NOTE — HISTORY OF PRESENT ILLNESS
[Medical Office: ([facility])___] : at the medical office located in  [Verbal consent obtained from patient] : the patient, [unfilled] [Other Location: e.g. School (Enter Location, City,State)___] : at [unfilled], at the time of the visit. [FreeTextEntry1] : [place]  was done July 18th 2022\par X-RAY was done July 18th\par 24 Hour Urine was scanned in the Chart it was done OCT 17th \par [phone]\par [email or web address]\par \par \par Saúl is a 52-year-old male born October 22, 1970 last seen on May 3, 2022.  He had shockwave lithotripsy of a right lower pole stone on January 18, 2022 an ultrasound done on March 15 showed a 4 mm residual stone, the metabolic work-up showed an elevated calcium and oxalate was high uric acid levels high sodium and high phosphorus.\par \par Given the poor metabolic work-up and the fact that KUB from January to May showed a 2 mm stone that went to a 4 mm stone which is an increase in volume of 800%.\par \par I recommended that he see a nephrologist.  He tells me that he saw Dr. Sanders, who said hold on HCTZ as his NA is "down" to 183 and we will wait and see if he could do better. Re the Ca she hopes if the NA drops so rex the Ca. Oxalate is over 50 and your UA is almost 900.\par \par I wanted a repeat KUB and ultrasound which were done in July, he was post to follow-up in August and is following up now 3 months later.\par \par As far as his ED, he is using Cialis at the 20 mg dose which he says is "only good" for once per night.  He wants more but is not willing to do injections and is not willing to go for surgery.  For now we decided to stay with the Cialis.  He tells me since last seen he continues with the Cialis at the 20 mg dose and it has been not .working

## 2022-11-21 NOTE — LETTER HEADER
[FreeTextEntry3] : Maria D Mariscal M.D.\par Director Emeritus of Urology\par [facility]/[place]\par [address] [place] [address], [address]\[place] [address]

## 2023-01-12 ENCOUNTER — APPOINTMENT (OUTPATIENT)
Dept: UROLOGY | Facility: CLINIC | Age: 53
End: 2023-01-12

## 2023-01-12 NOTE — HISTORY OF PRESENT ILLNESS
[Home] : at home, [unfilled] , at the time of the visit. [Medical Office: ([facility])___] : at the medical office located in  [FreeTextEntry1] : left a voice message on his 718 number with details regarding his TEB appt with Dr. Mariscal for 6:30pm DY 01/12/23 3:59pm\par \par pt called at [phone]\par \par \par message left that Dr. Mariscal calling we were supposed to have a telemed. please call office ot reschedule

## 2023-01-26 ENCOUNTER — EMERGENCY (EMERGENCY)
Facility: HOSPITAL | Age: 53
LOS: 0 days | Discharge: HOME | End: 2023-01-26
Attending: EMERGENCY MEDICINE | Admitting: EMERGENCY MEDICINE
Payer: MEDICAID

## 2023-01-26 VITALS
DIASTOLIC BLOOD PRESSURE: 99 MMHG | TEMPERATURE: 98 F | HEART RATE: 68 BPM | OXYGEN SATURATION: 98 % | SYSTOLIC BLOOD PRESSURE: 160 MMHG | RESPIRATION RATE: 16 BRPM

## 2023-01-26 DIAGNOSIS — K21.9 GASTRO-ESOPHAGEAL REFLUX DISEASE WITHOUT ESOPHAGITIS: ICD-10-CM

## 2023-01-26 DIAGNOSIS — Z98.890 OTHER SPECIFIED POSTPROCEDURAL STATES: Chronic | ICD-10-CM

## 2023-01-26 DIAGNOSIS — Z87.891 PERSONAL HISTORY OF NICOTINE DEPENDENCE: ICD-10-CM

## 2023-01-26 DIAGNOSIS — Z87.442 PERSONAL HISTORY OF URINARY CALCULI: ICD-10-CM

## 2023-01-26 DIAGNOSIS — Z91.010 ALLERGY TO PEANUTS: ICD-10-CM

## 2023-01-26 DIAGNOSIS — N13.2 HYDRONEPHROSIS WITH RENAL AND URETERAL CALCULOUS OBSTRUCTION: ICD-10-CM

## 2023-01-26 DIAGNOSIS — R10.9 UNSPECIFIED ABDOMINAL PAIN: ICD-10-CM

## 2023-01-26 DIAGNOSIS — Z91.018 ALLERGY TO OTHER FOODS: ICD-10-CM

## 2023-01-26 LAB
ALBUMIN SERPL ELPH-MCNC: 4.3 G/DL — SIGNIFICANT CHANGE UP (ref 3.5–5.2)
ALP SERPL-CCNC: 132 U/L — HIGH (ref 30–115)
ALT FLD-CCNC: 24 U/L — SIGNIFICANT CHANGE UP (ref 0–41)
ANION GAP SERPL CALC-SCNC: 7 MMOL/L — SIGNIFICANT CHANGE UP (ref 7–14)
APPEARANCE UR: CLEAR — SIGNIFICANT CHANGE UP
AST SERPL-CCNC: 18 U/L — SIGNIFICANT CHANGE UP (ref 0–41)
BACTERIA # UR AUTO: NEGATIVE — SIGNIFICANT CHANGE UP
BASOPHILS # BLD AUTO: 0.05 K/UL — SIGNIFICANT CHANGE UP (ref 0–0.2)
BASOPHILS NFR BLD AUTO: 0.4 % — SIGNIFICANT CHANGE UP (ref 0–1)
BILIRUB SERPL-MCNC: 0.8 MG/DL — SIGNIFICANT CHANGE UP (ref 0.2–1.2)
BILIRUB UR-MCNC: NEGATIVE — SIGNIFICANT CHANGE UP
BUN SERPL-MCNC: 13 MG/DL — SIGNIFICANT CHANGE UP (ref 10–20)
CALCIUM SERPL-MCNC: 9.1 MG/DL — SIGNIFICANT CHANGE UP (ref 8.4–10.4)
CHLORIDE SERPL-SCNC: 103 MMOL/L — SIGNIFICANT CHANGE UP (ref 98–110)
CO2 SERPL-SCNC: 27 MMOL/L — SIGNIFICANT CHANGE UP (ref 17–32)
COLOR SPEC: YELLOW — SIGNIFICANT CHANGE UP
CREAT SERPL-MCNC: 1.2 MG/DL — SIGNIFICANT CHANGE UP (ref 0.7–1.5)
DIFF PNL FLD: ABNORMAL
EGFR: 72 ML/MIN/1.73M2 — SIGNIFICANT CHANGE UP
EOSINOPHIL # BLD AUTO: 0.02 K/UL — SIGNIFICANT CHANGE UP (ref 0–0.7)
EOSINOPHIL NFR BLD AUTO: 0.2 % — SIGNIFICANT CHANGE UP (ref 0–8)
EPI CELLS # UR: 1 /HPF — SIGNIFICANT CHANGE UP (ref 0–5)
GLUCOSE SERPL-MCNC: 104 MG/DL — HIGH (ref 70–99)
GLUCOSE UR QL: NEGATIVE — SIGNIFICANT CHANGE UP
HCT VFR BLD CALC: 48.7 % — SIGNIFICANT CHANGE UP (ref 42–52)
HGB BLD-MCNC: 17.1 G/DL — SIGNIFICANT CHANGE UP (ref 14–18)
HYALINE CASTS # UR AUTO: 2 /LPF — SIGNIFICANT CHANGE UP (ref 0–7)
IMM GRANULOCYTES NFR BLD AUTO: 0.3 % — SIGNIFICANT CHANGE UP (ref 0.1–0.3)
KETONES UR-MCNC: NEGATIVE — SIGNIFICANT CHANGE UP
LEUKOCYTE ESTERASE UR-ACNC: NEGATIVE — SIGNIFICANT CHANGE UP
LIDOCAIN IGE QN: 36 U/L — SIGNIFICANT CHANGE UP (ref 7–60)
LYMPHOCYTES # BLD AUTO: 1.5 K/UL — SIGNIFICANT CHANGE UP (ref 1.2–3.4)
LYMPHOCYTES # BLD AUTO: 11.6 % — LOW (ref 20.5–51.1)
MCHC RBC-ENTMCNC: 32.9 PG — HIGH (ref 27–31)
MCHC RBC-ENTMCNC: 35.1 G/DL — SIGNIFICANT CHANGE UP (ref 32–37)
MCV RBC AUTO: 93.8 FL — SIGNIFICANT CHANGE UP (ref 80–94)
MONOCYTES # BLD AUTO: 0.9 K/UL — HIGH (ref 0.1–0.6)
MONOCYTES NFR BLD AUTO: 7 % — SIGNIFICANT CHANGE UP (ref 1.7–9.3)
NEUTROPHILS # BLD AUTO: 10.42 K/UL — HIGH (ref 1.4–6.5)
NEUTROPHILS NFR BLD AUTO: 80.5 % — HIGH (ref 42.2–75.2)
NITRITE UR-MCNC: NEGATIVE — SIGNIFICANT CHANGE UP
NRBC # BLD: 0 /100 WBCS — SIGNIFICANT CHANGE UP (ref 0–0)
PH UR: 6 — SIGNIFICANT CHANGE UP (ref 5–8)
PLATELET # BLD AUTO: 227 K/UL — SIGNIFICANT CHANGE UP (ref 130–400)
POTASSIUM SERPL-MCNC: 4.4 MMOL/L — SIGNIFICANT CHANGE UP (ref 3.5–5)
POTASSIUM SERPL-SCNC: 4.4 MMOL/L — SIGNIFICANT CHANGE UP (ref 3.5–5)
PROT SERPL-MCNC: 6.9 G/DL — SIGNIFICANT CHANGE UP (ref 6–8)
PROT UR-MCNC: ABNORMAL
RBC # BLD: 5.19 M/UL — SIGNIFICANT CHANGE UP (ref 4.7–6.1)
RBC # FLD: 11.5 % — SIGNIFICANT CHANGE UP (ref 11.5–14.5)
RBC CASTS # UR COMP ASSIST: 4 /HPF — SIGNIFICANT CHANGE UP (ref 0–4)
SODIUM SERPL-SCNC: 137 MMOL/L — SIGNIFICANT CHANGE UP (ref 135–146)
SP GR SPEC: 1.03 — SIGNIFICANT CHANGE UP (ref 1.01–1.03)
UROBILINOGEN FLD QL: SIGNIFICANT CHANGE UP
WBC # BLD: 12.93 K/UL — HIGH (ref 4.8–10.8)
WBC # FLD AUTO: 12.93 K/UL — HIGH (ref 4.8–10.8)
WBC UR QL: 1 /HPF — SIGNIFICANT CHANGE UP (ref 0–5)

## 2023-01-26 PROCEDURE — 74176 CT ABD & PELVIS W/O CONTRAST: CPT | Mod: 26,MA

## 2023-01-26 PROCEDURE — 99285 EMERGENCY DEPT VISIT HI MDM: CPT

## 2023-01-26 RX ORDER — KETOROLAC TROMETHAMINE 30 MG/ML
1 SYRINGE (ML) INJECTION
Qty: 20 | Refills: 0
Start: 2023-01-26 | End: 2023-01-30

## 2023-01-26 RX ORDER — KETOROLAC TROMETHAMINE 30 MG/ML
30 SYRINGE (ML) INJECTION ONCE
Refills: 0 | Status: DISCONTINUED | OUTPATIENT
Start: 2023-01-26 | End: 2023-01-26

## 2023-01-26 RX ORDER — SODIUM CHLORIDE 9 MG/ML
1000 INJECTION INTRAMUSCULAR; INTRAVENOUS; SUBCUTANEOUS ONCE
Refills: 0 | Status: COMPLETED | OUTPATIENT
Start: 2023-01-26 | End: 2023-01-26

## 2023-01-26 RX ORDER — TAMSULOSIN HYDROCHLORIDE 0.4 MG/1
1 CAPSULE ORAL
Qty: 30 | Refills: 0
Start: 2023-01-26 | End: 2023-02-24

## 2023-01-26 RX ADMIN — SODIUM CHLORIDE 1000 MILLILITER(S): 9 INJECTION INTRAMUSCULAR; INTRAVENOUS; SUBCUTANEOUS at 14:52

## 2023-01-26 RX ADMIN — Medication 30 MILLIGRAM(S): at 14:52

## 2023-01-26 NOTE — ED PROVIDER NOTE - PATIENT PORTAL LINK FT
You can access the FollowMyHealth Patient Portal offered by [facility] by registering at the following website: [email or web address]. By joining [employer]’s FollowMyHealth portal, you will also be able to view your health information using other applications (apps) compatible with our system.

## 2023-01-26 NOTE — CONSULT NOTE ADULT - NS ATTEND AMEND GEN_ALL_CORE FT
-CT scan reviewed = 4-6 mm right proximal ureteral fragment -- no hydronephrosis   -Pt presently asymptomatic   -advised = 1. MET with flomax ,toradol ,hydration and ambulation or 2. cysto ,right ureteral stent . Pt refuses  ureteral stent at this time and desires trial of spontaneous passage .

## 2023-01-26 NOTE — ED PROVIDER NOTE - ATTENDING CONTRIBUTION TO CARE
52-year-old male history of GERD, kidney stones status post recent lithotripsy, orthopedic surgeries, now presents with right flank pain since this morning, persistent, denies radiation/modifying factors, associated nausea, denies fever, v/d, anorexia, cough, respiratory sx, change in bowel habits or urinary sx, scrotal pain, penile d/c or other associated complaints at present. Old chart reviewed. I have reviewed and agree with the initial nursing note, except as documented in my note.    VSS, awake, alert, non-toxic appearing, no scleral icterus, oropharynx clear, mmm, no jaundice, skin rash or lesions, chest CTAB, non-labored breathing, no w/r/r, +S1/S2, RRR, no m/r/g, abdomen soft, NT, +BS, no hernias or distention, no pulsatile masses or bruits appreciated, no CVA tenderness, no peripheral edema or deformities, alert, clear speech and steady gait.

## 2023-01-26 NOTE — ED PROVIDER NOTE - NS ED ROS FT
Constitutional:  No fevers or chills.  Eyes:  No visual changes, eye pain, or discharge.  ENT:  No hearing changes. No sore throat.  Neck:  No neck pain or stiffness.  Cardiac:  No CP or edema.  Resp:  No cough or SOB. No hemoptysis.   GI:  No nausea, vomiting, diarrhea, or abdominal pain.  :  No dysuria, frequency, or hematuria.  (+) flank pain  MSK:  No myalgias or joint pain/swelling.  Neuro:  No headache, dizziness, or weakness.  Skin:  No skin rash.

## 2023-01-26 NOTE — CONSULT NOTE ADULT - ASSESSMENT
51 y/o m with a 5 mm proximal ureteral stone    A) Mild hydronephrosis 2/2 ureteral stone  nephrolithiasis    P) Pt was given all potential treatment options  after d/w the pt he has elected to try to pass the stone  on his own  D/c home on flomax, toradol, tramadol strainer  pt to f/u with Dr. Mariscal on Monday  d/w atending

## 2023-01-26 NOTE — ED PROVIDER NOTE - CARE PROVIDER_API CALL
Maria D Mariscal)  Urology  [address] [place] [address] [address] [place] [address]  Phone: [phone]  Fax: [phone]  Scheduled Appointment: 01/30/2023 10:00 AM

## 2023-01-26 NOTE — ED PROVIDER NOTE - CLINICAL SUMMARY MEDICAL DECISION MAKING FREE TEXT BOX
VSS, afebrile, labs WNL, UA WNL, pt appears comfortable, pain controlled, CT with ureteral stone, likely to pass with expectant management, will d/c with NSAIDS, flomax, urine strainer, outpt  f/u, pt amenable to plan.

## 2023-01-26 NOTE — CONSULT NOTE ADULT - SUBJECTIVE AND OBJECTIVE BOX
Urology Consult    PT seen and examined with Dr. Hu    Pt is a 51 y/o Male who presents with right flank pain since this am. Pt was given Toradol and  his pain has resolved. Pt with a 5 mm proximal ureteral stone. Mild hydronephrosis.  denies nausea, fever, chills, hematuria.     PAST MEDICAL & SURGICAL HISTORY:  Kidney stones      GERD (gastroesophageal reflux disease)      Former smoker      H/O lithotripsy      S/P knee surgery          MEDICATIONS  (STANDING):    MEDICATIONS  (PRN):      Allergies    Kiwi (Hives)  No Known Drug Allergies  peanuts (Rash)      SOCIAL HISTORY: No illicit drug use    FAMILY HISTORY:  non contributory to current issue    REVIEW OF SYSTEMS   [x] A ten-point review of systems was otherwise negative except as noted.    Vital Signs Last 24 Hrs  T(C): 36.6 (2023 13:58), Max: 36.6 (2023 13:58)  T(F): 97.9 (2023 13:58), Max: 97.9 (2023 13:58)  HR: 68 (2023 13:58) (68 - 68)  BP: 160/99 (2023 13:58) (160/99 - 160/99)  RR: 16 (2023 13:58) (16 - 16)  SpO2: 98% (2023 13:58) (98% - 98%)    Parameters below as of 2023 13:58  Patient On (Oxygen Delivery Method): room air      PHYSICAL EXAM:    GEN: NAD, awake and alert.  SKIN: Good color, non diaphoretic.  RESP: Non-labored breathing. No use of accessory muscles.  CARDIO: +S1/S2  ABDO: Soft, NT/ND, no palpable bladder, no suprapubic tenderness.  BACK: No CVAT B/L  : pt in hallway unable to examine at this time, voiding freely   EXT: CHIU x 4      I&O's Summary      LABS:                        17.1   12.93 )-----------( 227      ( 2023 14:58 )             48.7         137  |  103  |  13  ----------------------------<  104<H>  4.4   |  27  |  1.2    Ca    9.1      2023 14:58    TPro  6.9  /  Alb  4.3  /  TBili  0.8  /  DBili  x   /  AST  18  /  ALT  24  /  AlkPhos  132<H>        Urinalysis Basic - ( 2023 15:10 )    Color: Yellow / Appearance: Clear / S.027 / pH: x  Gluc: x / Ketone: Negative  / Bili: Negative / Urobili: <2 mg/dL   Blood: x / Protein: 30 mg/dL / Nitrite: Negative   Leuk Esterase: Negative / RBC: 4 /HPF / WBC 1 /HPF   Sq Epi: x / Non Sq Epi: 1 /HPF / Bacteria: Negative            RADIOLOGY & ADDITIONAL STUDIES:

## 2023-01-26 NOTE — ED PROVIDER NOTE - PHYSICAL EXAMINATION
PHYSICAL EXAM: I have reviewed current vital signs.  GENERAL: NAD, well-nourished; well-developed.  HEAD:  Normocephalic, atraumatic.  EYES: EOMI, PERRL, conjunctiva and sclera clear.  ENT: MMM, no erythema/exudates.  NECK: Supple, no JVD.  CHEST/LUNG: Clear to auscultation bilaterally; no wheezes, rales, or rhonchi.  HEART: Regular rate and rhythm, normal S1 and S2; no murmurs, rubs, or gallops.  ABDOMEN: Soft, nontender, nondistended.  no CVA tenderness  EXTREMITIES:  2+ peripheral pulses; no clubbing, cyanosis, or edema.  PSYCH: Cooperative, appropriate, normal mood and affect.  NEUROLOGY: A&O x 3. Motor 5/5. Sensory intact. No focal neurological deficits. CN II - XII intact. (-) dysmetria, facial droop, pronator drift.  SKIN: Warm and dry.

## 2023-01-26 NOTE — ED PROVIDER NOTE - PROGRESS NOTE DETAILS
[place]- per urology [place]- per urology patient able to be D/c with Toradol, and flomax.  patient will have appointment made with Dr. Mariscal this monday at 10am

## 2023-01-27 LAB
CULTURE RESULTS: NO GROWTH — SIGNIFICANT CHANGE UP
SPECIMEN SOURCE: SIGNIFICANT CHANGE UP

## 2023-01-30 ENCOUNTER — APPOINTMENT (OUTPATIENT)
Dept: UROLOGY | Facility: CLINIC | Age: 53
End: 2023-01-30
Payer: MEDICAID

## 2023-01-30 VITALS
SYSTOLIC BLOOD PRESSURE: 140 MMHG | HEART RATE: 72 BPM | BODY MASS INDEX: 27.92 KG/M2 | HEIGHT: 70 IN | WEIGHT: 195 LBS | DIASTOLIC BLOOD PRESSURE: 86 MMHG

## 2023-01-30 PROCEDURE — 99214 OFFICE O/P EST MOD 30 MIN: CPT

## 2023-01-30 RX ORDER — TAMSULOSIN HCL 0.4 MG
0.4 CAPSULE ORAL
Refills: 0 | Status: ACTIVE | COMMUNITY

## 2023-01-30 RX ORDER — KETOROLAC TROMETHAMINE 10 MG
TABLET ORAL
Refills: 0 | Status: ACTIVE | COMMUNITY

## 2023-01-30 NOTE — LETTER BODY
[Dear  ___] : Dear  [unfilled], [Courtesy Letter:] : I had the pleasure of seeing your patient, [unfilled], in my office today. [Please see my note below.] : Please see my note below. [Sincerely,] : Sincerely, [FreeTextEntry2] : Prince Hoyt, DO\par [address]\[place] [id number]

## 2023-01-30 NOTE — HISTORY OF PRESENT ILLNESS
[FreeTextEntry1] : Saúl is a 52-year-old male born October 22, 1970 who we have been following for years.  He has had shockwave lithotripsy twice the last time left him with a 2 mm fragment in the right lower pole.  That was in January 2022 and the x-ray was done in February 2022.  A follow-up film in July 2022 showed the stone was now 4 mm, he was following up with Dr. Brown Morgan for stone prevention were hoping not to have to do another treatment he had a follow-up scheduled for January 12, 2023 for which she was a no-show then he ended up in the emergency room in January 26 4 he had a 4 x 4 x 8 mm stone sakes somewhat like an upside down snowman with Hounsfield units of about 700.  He was offered ureteroscopy, stent, admission with hydration and observation he ended up going home on Flomax and Toradol tells me he is straining his urine every time he voids and that he has not seen the stone, has been taking Flomax but not the Toradol and feels okay.  He does have some discomfort that is minor perhaps 2 or 3 on a scale of 1-10 it is migrated from the back and is now in the right lower quadrant.  He wants to know what he can expect and what we are going to do. [3] : 3 [Aching] : aching [Gradual] : gradual [Intermittent] : intermittently [___ x Day] : occur [unfilled] times a day [___ Minute(s)] : last for [unfilled] minute(s) [Mild] : mild [Unchanged] : unchanged [None] : No relieving factors are noted [de-identified] : Right lower quadrant [de-identified] : January 26, 2023 [de-identified] : Yesterday during her

## 2023-01-30 NOTE — ASSESSMENT
[FreeTextEntry1] : He tells me someone told him that stones can grow over a year and I showed him his x-rays from February, July of the CAT scan from Thursday of last week.  The stone has grown and #1 we need to get rid of this stone (at this point he has no more, but he really needs to get to someone who is going to help him prevent stones.

## 2023-01-30 NOTE — LETTER HEADER
[FreeTextEntry3] : Maria D Mariscal M.D.\par Director Emeritus of Urology\par [facility]\par [address] [place] [address], [address]\[place] [address]

## 2023-02-09 ENCOUNTER — APPOINTMENT (OUTPATIENT)
Dept: UROLOGY | Facility: CLINIC | Age: 53
End: 2023-02-09
Payer: MEDICAID

## 2023-02-09 ENCOUNTER — OUTPATIENT (OUTPATIENT)
Dept: OUTPATIENT SERVICES | Facility: HOSPITAL | Age: 53
LOS: 1 days | End: 2023-02-09
Payer: MEDICAID

## 2023-02-09 ENCOUNTER — RESULT REVIEW (OUTPATIENT)
Age: 53
End: 2023-02-09

## 2023-02-09 VITALS
SYSTOLIC BLOOD PRESSURE: 116 MMHG | RESPIRATION RATE: 14 BRPM | OXYGEN SATURATION: 99 % | WEIGHT: 198 LBS | BODY MASS INDEX: 28.35 KG/M2 | HEART RATE: 77 BPM | TEMPERATURE: 98 F | DIASTOLIC BLOOD PRESSURE: 79 MMHG | HEIGHT: 70 IN

## 2023-02-09 DIAGNOSIS — R10.9 UNSPECIFIED ABDOMINAL PAIN: ICD-10-CM

## 2023-02-09 DIAGNOSIS — Z98.890 OTHER SPECIFIED POSTPROCEDURAL STATES: Chronic | ICD-10-CM

## 2023-02-09 DIAGNOSIS — N20.1 CALCULUS OF URETER: ICD-10-CM

## 2023-02-09 PROCEDURE — 99214 OFFICE O/P EST MOD 30 MIN: CPT

## 2023-02-09 PROCEDURE — 74019 RADEX ABDOMEN 2 VIEWS: CPT | Mod: 26

## 2023-02-09 PROCEDURE — 74019 RADEX ABDOMEN 2 VIEWS: CPT

## 2023-02-09 NOTE — ASSESSMENT
[FreeTextEntry1] : the stone is visible and we can consider ESWL vs ureteroscopy with the success of ureteroscopy higher but involves invasive treatment\par He choses ESWL and in he interim he will continue with the medical expulsive therapy AKA Flomax and an off label use straining his urine over time and hopefully will pass before we get it.  He is aware that once this is all done we will can have to work on stone prevention.  If at any point he gets fever or severe pain then he will have to go to the emergency room and we will have to work transurethral.  He is aware that shockwave lithotripsy may not work, may work but break it up to big pieces AKA of [place] and the lead up needing a double-J anyway.  He is also aware that if we go for ureteroscopy we may not be able to get up to the stone and/or the urine may be cloudy so he did end up with a temporary double-J and then left ureteroscopy a double-J postop

## 2023-02-09 NOTE — LETTER HEADER
[FreeTextEntry3] : Maria D Mariscal M.D.\par Director Emeritus of Urology\par [facility]\par [address] [place] [address], [address]\[place] [address]

## 2023-02-09 NOTE — LETTER BODY
[Dear  ___] : Dear  [unfilled], [Courtesy Letter:] : I had the pleasure of seeing your patient, [unfilled], in my office today. [Please see my note below.] : Please see my note below. [Sincerely,] : Sincerely, [FreeTextEntry2] : Prince Hoyt, DO\par [address]\[place] [phone]

## 2023-02-09 NOTE — HISTORY OF PRESENT ILLNESS
[FreeTextEntry1] : Saúl is a 52-year-old male born October 22, 1970 who we have been following for years, last seen 01/30/2023.  He has had shockwave lithotripsy twice the last time left him with a residual 2 mm fragment in the right lower pole.  That was in January 2022 and the x-ray was done in February 2022.  A follow-up film in July 2022 showed the stone was now 4 mm, he was following up with Dr. Brown Morgan for stone prevention we were hoping not to have to do another treatment and he had a follow-up scheduled for January 12, 2023 for which he was a no-show then he ended up in the emergency room on January 26 4 where he was found to have a 4 x 4 x 8 mm stone shaped somewhat like an upside down snowman with Hounsfield units of about 700 in the mid right ureter.  He was offered ureteroscopy, stent, admission with hydration and observation he ended up going home on Flomax and Toradol. \par \par exam was stable and he was asked to continue the flomax, toradol as needed kub and f/u to discuss his options eswl vs ureteroscopy ve MET

## 2023-02-10 DIAGNOSIS — N20.1 CALCULUS OF URETER: ICD-10-CM

## 2023-02-10 DIAGNOSIS — R10.9 UNSPECIFIED ABDOMINAL PAIN: ICD-10-CM

## 2023-02-23 ENCOUNTER — NON-APPOINTMENT (OUTPATIENT)
Age: 53
End: 2023-02-23

## 2023-02-23 RX ORDER — TAMSULOSIN HYDROCHLORIDE 0.4 MG/1
0.4 CAPSULE ORAL
Qty: 30 | Refills: 3 | Status: ACTIVE | COMMUNITY
Start: 2023-02-23 | End: 1900-01-01

## 2023-03-07 ENCOUNTER — RESULT REVIEW (OUTPATIENT)
Age: 53
End: 2023-03-07

## 2023-03-07 ENCOUNTER — OUTPATIENT (OUTPATIENT)
Dept: OUTPATIENT SERVICES | Facility: HOSPITAL | Age: 53
LOS: 1 days | End: 2023-03-07
Payer: MEDICAID

## 2023-03-07 VITALS
DIASTOLIC BLOOD PRESSURE: 85 MMHG | TEMPERATURE: 96 F | OXYGEN SATURATION: 100 % | SYSTOLIC BLOOD PRESSURE: 141 MMHG | HEIGHT: 70 IN | WEIGHT: 190.04 LBS | RESPIRATION RATE: 18 BRPM | HEART RATE: 70 BPM

## 2023-03-07 DIAGNOSIS — Z98.890 OTHER SPECIFIED POSTPROCEDURAL STATES: Chronic | ICD-10-CM

## 2023-03-07 DIAGNOSIS — N20.0 CALCULUS OF KIDNEY: ICD-10-CM

## 2023-03-07 DIAGNOSIS — Z01.818 ENCOUNTER FOR OTHER PREPROCEDURAL EXAMINATION: ICD-10-CM

## 2023-03-07 LAB
ALBUMIN SERPL ELPH-MCNC: 4.4 G/DL — SIGNIFICANT CHANGE UP (ref 3.5–5.2)
ALP SERPL-CCNC: 140 U/L — HIGH (ref 30–115)
ALT FLD-CCNC: 27 U/L — SIGNIFICANT CHANGE UP (ref 0–41)
ANION GAP SERPL CALC-SCNC: 13 MMOL/L — SIGNIFICANT CHANGE UP (ref 7–14)
APPEARANCE UR: CLEAR — SIGNIFICANT CHANGE UP
APTT BLD: 28.5 SEC — SIGNIFICANT CHANGE UP (ref 27–39.2)
AST SERPL-CCNC: 21 U/L — SIGNIFICANT CHANGE UP (ref 0–41)
BACTERIA # UR AUTO: NEGATIVE — SIGNIFICANT CHANGE UP
BASOPHILS # BLD AUTO: 0.04 K/UL — SIGNIFICANT CHANGE UP (ref 0–0.2)
BASOPHILS NFR BLD AUTO: 0.5 % — SIGNIFICANT CHANGE UP (ref 0–1)
BILIRUB SERPL-MCNC: 0.8 MG/DL — SIGNIFICANT CHANGE UP (ref 0.2–1.2)
BILIRUB UR-MCNC: NEGATIVE — SIGNIFICANT CHANGE UP
BUN SERPL-MCNC: 14 MG/DL — SIGNIFICANT CHANGE UP (ref 10–20)
CALCIUM SERPL-MCNC: 9.3 MG/DL — SIGNIFICANT CHANGE UP (ref 8.4–10.5)
CHLORIDE SERPL-SCNC: 101 MMOL/L — SIGNIFICANT CHANGE UP (ref 98–110)
CO2 SERPL-SCNC: 24 MMOL/L — SIGNIFICANT CHANGE UP (ref 17–32)
COLOR SPEC: SIGNIFICANT CHANGE UP
CREAT SERPL-MCNC: 1.2 MG/DL — SIGNIFICANT CHANGE UP (ref 0.7–1.5)
DIFF PNL FLD: ABNORMAL
EGFR: 72 ML/MIN/1.73M2 — SIGNIFICANT CHANGE UP
EOSINOPHIL # BLD AUTO: 0.22 K/UL — SIGNIFICANT CHANGE UP (ref 0–0.7)
EOSINOPHIL NFR BLD AUTO: 2.7 % — SIGNIFICANT CHANGE UP (ref 0–8)
EPI CELLS # UR: 0 /HPF — SIGNIFICANT CHANGE UP (ref 0–5)
GLUCOSE SERPL-MCNC: 77 MG/DL — SIGNIFICANT CHANGE UP (ref 70–99)
GLUCOSE UR QL: NEGATIVE — SIGNIFICANT CHANGE UP
HCT VFR BLD CALC: 49.4 % — SIGNIFICANT CHANGE UP (ref 42–52)
HGB BLD-MCNC: 16.8 G/DL — SIGNIFICANT CHANGE UP (ref 14–18)
HYALINE CASTS # UR AUTO: 0 /LPF — SIGNIFICANT CHANGE UP (ref 0–7)
IMM GRANULOCYTES NFR BLD AUTO: 0.2 % — SIGNIFICANT CHANGE UP (ref 0.1–0.3)
INR BLD: 0.91 RATIO — SIGNIFICANT CHANGE UP (ref 0.65–1.3)
KETONES UR-MCNC: NEGATIVE — SIGNIFICANT CHANGE UP
LEUKOCYTE ESTERASE UR-ACNC: NEGATIVE — SIGNIFICANT CHANGE UP
LYMPHOCYTES # BLD AUTO: 2.42 K/UL — SIGNIFICANT CHANGE UP (ref 1.2–3.4)
LYMPHOCYTES # BLD AUTO: 29.7 % — SIGNIFICANT CHANGE UP (ref 20.5–51.1)
MCHC RBC-ENTMCNC: 32.5 PG — HIGH (ref 27–31)
MCHC RBC-ENTMCNC: 34 G/DL — SIGNIFICANT CHANGE UP (ref 32–37)
MCV RBC AUTO: 95.6 FL — HIGH (ref 80–94)
MONOCYTES # BLD AUTO: 0.71 K/UL — HIGH (ref 0.1–0.6)
MONOCYTES NFR BLD AUTO: 8.7 % — SIGNIFICANT CHANGE UP (ref 1.7–9.3)
NEUTROPHILS # BLD AUTO: 4.73 K/UL — SIGNIFICANT CHANGE UP (ref 1.4–6.5)
NEUTROPHILS NFR BLD AUTO: 58.2 % — SIGNIFICANT CHANGE UP (ref 42.2–75.2)
NITRITE UR-MCNC: NEGATIVE — SIGNIFICANT CHANGE UP
NRBC # BLD: 0 /100 WBCS — SIGNIFICANT CHANGE UP (ref 0–0)
PH UR: 6 — SIGNIFICANT CHANGE UP (ref 5–8)
PLATELET # BLD AUTO: 220 K/UL — SIGNIFICANT CHANGE UP (ref 130–400)
POTASSIUM SERPL-MCNC: 4.3 MMOL/L — SIGNIFICANT CHANGE UP (ref 3.5–5)
POTASSIUM SERPL-SCNC: 4.3 MMOL/L — SIGNIFICANT CHANGE UP (ref 3.5–5)
PROT SERPL-MCNC: 7.1 G/DL — SIGNIFICANT CHANGE UP (ref 6–8)
PROT UR-MCNC: SIGNIFICANT CHANGE UP
PROTHROM AB SERPL-ACNC: 10.4 SEC — SIGNIFICANT CHANGE UP (ref 9.95–12.87)
RBC # BLD: 5.17 M/UL — SIGNIFICANT CHANGE UP (ref 4.7–6.1)
RBC # FLD: 12 % — SIGNIFICANT CHANGE UP (ref 11.5–14.5)
RBC CASTS # UR COMP ASSIST: 8 /HPF — HIGH (ref 0–4)
SODIUM SERPL-SCNC: 138 MMOL/L — SIGNIFICANT CHANGE UP (ref 135–146)
SP GR SPEC: 1.03 — SIGNIFICANT CHANGE UP (ref 1.01–1.03)
UROBILINOGEN FLD QL: SIGNIFICANT CHANGE UP
WBC # BLD: 8.14 K/UL — SIGNIFICANT CHANGE UP (ref 4.8–10.8)
WBC # FLD AUTO: 8.14 K/UL — SIGNIFICANT CHANGE UP (ref 4.8–10.8)
WBC UR QL: 5 /HPF — SIGNIFICANT CHANGE UP (ref 0–5)

## 2023-03-07 PROCEDURE — 71046 X-RAY EXAM CHEST 2 VIEWS: CPT | Mod: 26

## 2023-03-07 PROCEDURE — 85610 PROTHROMBIN TIME: CPT

## 2023-03-07 PROCEDURE — 36415 COLL VENOUS BLD VENIPUNCTURE: CPT

## 2023-03-07 PROCEDURE — 85730 THROMBOPLASTIN TIME PARTIAL: CPT

## 2023-03-07 PROCEDURE — 93005 ELECTROCARDIOGRAM TRACING: CPT

## 2023-03-07 PROCEDURE — 71046 X-RAY EXAM CHEST 2 VIEWS: CPT

## 2023-03-07 PROCEDURE — 80053 COMPREHEN METABOLIC PANEL: CPT

## 2023-03-07 PROCEDURE — 81001 URINALYSIS AUTO W/SCOPE: CPT

## 2023-03-07 PROCEDURE — 93010 ELECTROCARDIOGRAM REPORT: CPT

## 2023-03-07 PROCEDURE — 87086 URINE CULTURE/COLONY COUNT: CPT

## 2023-03-07 PROCEDURE — 85025 COMPLETE CBC W/AUTO DIFF WBC: CPT

## 2023-03-07 PROCEDURE — 99214 OFFICE O/P EST MOD 30 MIN: CPT | Mod: 25

## 2023-03-07 RX ORDER — OMEPRAZOLE 10 MG/1
1 CAPSULE, DELAYED RELEASE ORAL
Qty: 0 | Refills: 0 | DISCHARGE

## 2023-03-07 NOTE — H&P PST ADULT - NSICDXFAMILYHX_GEN_ALL_CORE_FT
FAMILY HISTORY:  Father  Still living? Unknown  Family history of diabetes mellitus (DM), Age at diagnosis: Age Unknown  FH: prostate cancer, Age at diagnosis: Age Unknown

## 2023-03-07 NOTE — H&P PST ADULT - REASON FOR ADMISSION
51 Y/O M SCHEDULED FOR PAST for RIGHT EXTRACORPOREAL SHOCKWAVE LITHOTRIPSY on 3/21/23 with dr peralta under [place]. PT REPORTS PMHX OF KIDNEY STONES S/P LITHOTRIPSY 1/2022 RIGHT SIDE.

## 2023-03-07 NOTE — H&P PST ADULT - HISTORY OF PRESENT ILLNESS
CURRENTLY  DENIES ANY CP, SOB, PALPITATIONS, COUGH OR DYSURIA  EXERCISE TOLERANCE 5 FOS WITHOUT SOB    AS PER PATIENT  this is his/her complete medical history including medications - PRESCRIPTIONS  OVER THE COUNTER MEDS    pt denies any covid s/s, or tested positive in the past.  Received covid vaccine x 3 doses  pt advised self quarantine till day of procedure    Anesthesia Alert  NO--Difficult Airway  NO--History of neck surgery or radiation  NO--Limited ROM of neck  NO--History of Malignant hyperthermia  NO--No personal or family history of Pseudocholinesterase deficiency.  NO--Prior Anesthesia Complication  NO--Latex Allergy  NO--Loose teeth  NO--History of Rheumatoid Arthritis  NO--LUKE  NO--Bleeding risk  NO--Other_____ class II    Patient verbalized understanding of instructions and was given the opportunity to ask questions and have them answered.

## 2023-03-07 NOTE — H&P PST ADULT - ASSESSMENT
npo overnight  marijuana yesterday  nkda  no nsaids  did not see the stone  kub shows stone in the ureter without change

## 2023-03-08 DIAGNOSIS — N20.0 CALCULUS OF KIDNEY: ICD-10-CM

## 2023-03-08 DIAGNOSIS — Z01.818 ENCOUNTER FOR OTHER PREPROCEDURAL EXAMINATION: ICD-10-CM

## 2023-03-08 LAB
CULTURE RESULTS: NO GROWTH — SIGNIFICANT CHANGE UP
SPECIMEN SOURCE: SIGNIFICANT CHANGE UP

## 2023-03-17 ENCOUNTER — LABORATORY RESULT (OUTPATIENT)
Age: 53
End: 2023-03-17

## 2023-03-21 ENCOUNTER — RESULT REVIEW (OUTPATIENT)
Age: 53
End: 2023-03-21

## 2023-03-21 ENCOUNTER — OUTPATIENT (OUTPATIENT)
Dept: INPATIENT UNIT | Facility: HOSPITAL | Age: 53
LOS: 1 days | Discharge: HOME | End: 2023-03-21
Payer: MEDICAID

## 2023-03-21 ENCOUNTER — TRANSCRIPTION ENCOUNTER (OUTPATIENT)
Age: 53
End: 2023-03-21

## 2023-03-21 ENCOUNTER — APPOINTMENT (OUTPATIENT)
Dept: UROLOGY | Facility: HOSPITAL | Age: 53
End: 2023-03-21

## 2023-03-21 VITALS — HEART RATE: 53 BPM | SYSTOLIC BLOOD PRESSURE: 140 MMHG | RESPIRATION RATE: 17 BRPM | DIASTOLIC BLOOD PRESSURE: 96 MMHG

## 2023-03-21 VITALS
HEIGHT: 70 IN | SYSTOLIC BLOOD PRESSURE: 130 MMHG | DIASTOLIC BLOOD PRESSURE: 88 MMHG | HEART RATE: 58 BPM | RESPIRATION RATE: 17 BRPM | OXYGEN SATURATION: 100 % | TEMPERATURE: 97 F | WEIGHT: 195.11 LBS

## 2023-03-21 DIAGNOSIS — N20.0 CALCULUS OF KIDNEY: ICD-10-CM

## 2023-03-21 DIAGNOSIS — Z98.890 OTHER SPECIFIED POSTPROCEDURAL STATES: Chronic | ICD-10-CM

## 2023-03-21 PROCEDURE — 74018 RADEX ABDOMEN 1 VIEW: CPT | Mod: 26

## 2023-03-21 PROCEDURE — 50590 FRAGMENTING OF KIDNEY STONE: CPT | Mod: RT

## 2023-03-21 PROCEDURE — 74018 RADEX ABDOMEN 1 VIEW: CPT

## 2023-03-21 RX ORDER — HYDROMORPHONE HYDROCHLORIDE 2 MG/ML
0.5 INJECTION INTRAMUSCULAR; INTRAVENOUS; SUBCUTANEOUS
Refills: 0 | Status: DISCONTINUED | OUTPATIENT
Start: 2023-03-21 | End: 2023-03-21

## 2023-03-21 RX ORDER — ONDANSETRON 8 MG/1
4 TABLET, FILM COATED ORAL ONCE
Refills: 0 | Status: DISCONTINUED | OUTPATIENT
Start: 2023-03-21 | End: 2023-03-21

## 2023-03-21 RX ORDER — SODIUM CHLORIDE 9 MG/ML
1000 INJECTION, SOLUTION INTRAVENOUS
Refills: 0 | Status: DISCONTINUED | OUTPATIENT
Start: 2023-03-21 | End: 2023-03-21

## 2023-03-21 RX ORDER — ESOMEPRAZOLE MAGNESIUM 40 MG/1
1 CAPSULE, DELAYED RELEASE ORAL
Qty: 0 | Refills: 0 | DISCHARGE

## 2023-03-21 RX ORDER — HYDROMORPHONE HYDROCHLORIDE 2 MG/ML
1 INJECTION INTRAMUSCULAR; INTRAVENOUS; SUBCUTANEOUS
Refills: 0 | Status: DISCONTINUED | OUTPATIENT
Start: 2023-03-21 | End: 2023-03-21

## 2023-03-21 RX ORDER — MEPERIDINE HYDROCHLORIDE 50 MG/ML
12.5 INJECTION INTRAMUSCULAR; INTRAVENOUS; SUBCUTANEOUS ONCE
Refills: 0 | Status: DISCONTINUED | OUTPATIENT
Start: 2023-03-21 | End: 2023-03-21

## 2023-03-21 NOTE — BRIEF OPERATIVE NOTE - OPERATION/FINDINGS
stone not visible when we stopped  shocks    power    rate   1200        7            60   800          7            90

## 2023-03-21 NOTE — ASU PATIENT PROFILE, ADULT - FALL HARM RISK - UNIVERSAL INTERVENTIONS
Bed in lowest position, wheels locked, appropriate side rails in place/Call bell, personal items and telephone in reach/Instruct patient to call for assistance before getting out of bed or chair/Non-slip footwear when patient is out of bed/[place] to call system/Physically safe environment - no spills, clutter or unnecessary equipment/Purposeful Proactive Rounding/Room/bathroom lighting operational, light cord in reach

## 2023-03-21 NOTE — CHART NOTE - NSCHARTNOTEFT_GEN_A_CORE
PACU ANESTHESIA ADMISSION NOTE      Procedure: ESWL, kidney, right  ureteral      Post op diagnosis:  Right ureteral calculus        ____  Intubated  TV:______       Rate: ______      FiO2: ______    _x___  Patent Airway    _x___  Full return of protective reflexes    ____  Full recovery from anesthesia / back to baseline status    Vitals:P 81 R 14 T 97.8 /97 SPO2 97%  T(C): 36 (03-21-23 @ 08:42), Max: 36 (03-21-23 @ 07:22)  HR: 58 (03-21-23 @ 08:42) (58 - 58)  BP: 130/88 (03-21-23 @ 08:42) (130/88 - 130/88)  RR: 17 (03-21-23 @ 08:42) (17 - 17)  SpO2: 100% (03-21-23 @ 08:42) (100% - 100%)    Mental Status:  _x___ Awake   _____ Alert   _____ Drowsy   _____ Sedated    Nausea/Vomiting:  _x___  NO       ______Yes,   See Post - Op Orders         Pain Scale (0-10):  __0___    Treatment: _x___ None    ____ See Post - Op/PCA Orders    Post - Operative Fluids:   ____ Oral   __x__ See Post - Op Orders  -------------as per surgeon    Plan: Discharge:   _x___Home       _____Floor     _____Critical Care    _____  Other:_________________    Comments:  No anesthesia issues or complications noted.  Discharge when criteria met.

## 2023-03-21 NOTE — ASU DISCHARGE PLAN (ADULT/PEDIATRIC) - CARE PROVIDER_API CALL
Maria D Mariscal)  Urology  [address] [place] [address] [address] [place] [address]  Phone: [phone]  Fax: [phone]  Established Patient  Follow Up Time:

## 2023-03-21 NOTE — ASU DISCHARGE PLAN (ADULT/PEDIATRIC) - DISCHARGE PLAN IS COMPLETE AND GIVEN TO PATIENT
Patient : Ana Hyde Age: 45 year old Sex: female   MRN: [id number] Encounter Date: 9/2/2019    P02/P2    History     Chief Complaint   Patient presents with   • Abdominal Pain     HPI    9/2/2019  9:11 AM Ana Hyde is a 45 year old female with a hx of depression, DM, and IBS who presents to the ED for evaluation of R sided abd pain that migrates to her R shoulder that began 2 days ago. She reports that her pain is worse with breathing. She states that she has a hx of endometriosis, but this pain does not feel similar to her endometriosis pain. Her pain has not been controlled with Ibuprofen at home. Pt also c/o decreased appetite and distended abd. Pt denies NVD, vaginal sx, or any other pertinent complaints at this time. Pt denies a hx of abd surgeries. There are no further complaints or modifying factors at this time.    PCP: Belkis Santiago MD    Chart Review: I reviewed the patient's medications, allergies, and past medical and surgical history in Epic.     US pelvis from 11/21/18:    IMPRESSION:  1.   Solid appearing mass in the left ovary and likely left hydrosalpinx.  Surgical consultation   is advised given its large size.  A postcontrast MRI of the pelvis may be obtained for better   characterization.  2.   Small hemorrhagic cyst or endometrioma in the right ovary.  This could also be further   characterized by postcontrast MRI of the pelvis.  Alternatively, a follow-up ultrasound may be   obtained in 6-8 weeks to assess for resolution/stability.    MRI MRCP from 7/21/18:    IMPRESSION:  1. No evidence of cholelithiasis or choledocholithiasis.  2.  Nonspecific minimal impression on the medial margin of the proximal mid common bile duct.   Otherwise, no evidence of biliary stricture.  3.  No pancreatic ductal dilatation.    Allergies   Allergen Reactions   • Mold   (Environmental) HIVES and SHORTNESS OF BREATH   • Gluten Meal   (Food Or Med) Other (See Comments)     Unknown    • Pollen Other (See  Comments)     Unknown    • Sulfa Drugs Cross Reactors      Eye drops    • Unknown Other (See Comments)     All animal dander.       Discharge Medication List as of 9/2/2019 12:38 PM      Prior to Admission Medications    Details   methocarbamol (ROBAXIN) 500 MG tablet Take 1 tablet by mouth 3 times daily. For muscle spasmNormal, Disp-15 tablet, R-0      POTASSIUM CHLORIDE ER PO Historical Med      sodium chloride 1 g tablet Take 1 g by mouth 3 times daily.Historical Med      metoPROLOL tartrate (LOPRESSOR) 50 MG tablet Historical Med      LORAZEPAM PO Historical Med      TAURINE PO Historical Med      Omega-3 Fatty Acids (FISH OIL) 1200 MG capsule Take 1,200 mg by mouth 2 times daily.Historical Med, 1,200 mg, Oral, 2 TIMES DAILY      Cholecalciferol (VITAMIN D) 1000 UNIT capsule Take 1,000 Units by mouth daily.Historical Med, 1,000 Units, Oral, DAILY           Past Medical History:   Diagnosis Date   • Anxiety and depression 12/18/2018   • Colitis 12/18/2018   • Depression 1989    coincides with physical well-being; apathy, fatigue, anxiety, irritability, hopelessness; prozac 1.5yr , benzo; 2 sucidal attemptss by CO- once in college and once at 31; has had therapy sessions; continues to have sucidal idealation   • Digestive problem 1989, 2010    constant abd, back pain; constipation occ loose; Manganeese, HCL   • Early satiety 12/18/2018   • Endometrioma of ovary 12/18/2018   • Headache(784.0) 2011   • Hypothyroid    • IBS (irritable bowel syndrome) 12/18/2018   • Insomnia 2005   • Postural orthostatic tachycardia syndrome 12/18/2018   • Type II or unspecified type diabetes mellitus without mention of complication, not stated as uncontrolled     type 2; claims she is; ?being followed by endocrinologist but not on any medication   • Weight loss 12/18/2018       Past Surgical History:   Procedure Laterality Date   • ----------ULTRASOUND----------  '07    abd, pelvic: NL   • COLONOSCOPY  '07    NL   •  ESOPHAGOGASTRODUODENOSCOPY  '07    NL   • HAND RECONSTRUCTION  15 yrs ago.     right hand   • MRCP         Family History   Problem Relation Age of Onset   • Neurological Disorder Father         MS   • Cancer Maternal Grandmother         ovarian(50), br(65), stomach(80)   • Heart Paternal Grandmother         CHF; DM   • Heart Paternal Aunt    • Other Paternal Uncle         3 brothers, 1 sister Fibromylagia, MS, SCL, CHF   • Cancer Maternal Aunt         2 pancreatic   • Cancer Other         M cousin- breast       Social History     Tobacco Use   • Smoking status: Former Smoker     Packs/day: 0.30     Years: 4.00     Pack years: 1.20     Last attempt to quit: 1990     Years since quittin.6   • Smokeless tobacco: Never Used   Substance Use Topics   • Alcohol use: Yes     Alcohol/week: 0.0 - 0.8 standard drinks   • Drug use: No     Comment: tried THC x 4-last use        Review of Systems   Constitutional: Negative for fatigue.   HENT: Negative for congestion.    Eyes: Negative for discharge.   Respiratory: Negative for shortness of breath.    Cardiovascular: Negative for chest pain.   Gastrointestinal: Positive for abdominal pain. Negative for nausea and vomiting.   Genitourinary: Negative for dysuria.   Musculoskeletal: Negative for myalgias.   Skin: Negative for rash.   Allergic/Immunologic: Negative for food allergies.   Neurological: Negative for light-headedness.   Psychiatric/Behavioral: Negative for confusion.       Physical Exam     ED Triage Vitals [19 0901]   ED Triage Vitals Group      Temp 98.4 °F (36.9 °C)      Pulse 94      Resp 12      /62      SpO2 97 %      EtCO2 mmHg       Height 5' 4\" (1.626 m)      Weight 165 lb (74.8 kg)      Weight Scale Used ED Stated       Physical Exam   Constitutional: She is oriented to person, place, and time. She appears well-developed.   HENT:   Head: Normocephalic and atraumatic.   Eyes: Pupils are equal, round, and reactive to light. Conjunctivae  and EOM are normal.   Neck: Normal range of motion.   Cardiovascular: Regular rhythm, normal heart sounds and intact distal pulses. Tachycardia present. Exam reveals no gallop and no friction rub.   No murmur heard.  Pulses:       Radial pulses are 2+ on the right side and 2+ on the left side.        Dorsalis pedis pulses are 2+ on the right side and 2+ on the left side.   Pulmonary/Chest: Effort normal and breath sounds normal. No stridor. No respiratory distress.   Hyperventilating   Abdominal: Soft. Bowel sounds are normal. She exhibits distension (slight). She exhibits no mass. There is tenderness in the right lower quadrant. There is no rebound and no guarding. Musculoskeletal: Normal range of motion.         General: No tenderness or edema.     Neurological: She is alert and oriented to person, place, and time. She has normal strength. No cranial nerve deficit or sensory deficit. GCS eye subscore is 4. GCS verbal subscore is 5. GCS motor subscore is 6.   Skin: Skin is warm and dry. No rash noted. No erythema. No pallor.   Psychiatric: Her behavior is normal. Her mood appears anxious (severely).   Nursing note and vitals reviewed.      ED Course     Procedures    Lab Results     Results for orders placed or performed during the hospital encounter of 09/02/19   CBC with Automated Differential   Result Value Ref Range    WBC 11.6 (H) 4.2 - 11.0 K/mcL    RBC 4.63 4.00 - 5.20 mil/mcL    HGB 13.8 12.0 - 15.5 g/dL    HCT 42.1 36.0 - 46.5 %    MCV 90.9 78.0 - 100.0 fl    MCH 29.8 26.0 - 34.0 pg    MCHC 32.8 32.0 - 36.5 g/dL    RDW-CV 13.2 11.0 - 15.0 %     140 - 450 K/mcL    NRBC 0 0 /100 WBC    DIFF TYPE AUTOMATED DIFFERENTIAL     Neutrophil 89 %    LYMPH 6 %    MONO 5 %    EOSIN 0 %    BASO 0 %    Percent Immature Granuloctyes 0 %    Absolute Neutrophil 10.3 (H) 1.8 - 7.7 K/mcL    Absolute Lymph 0.6 (L) 1.0 - 4.8 K/mcL    Absolute Mono 0.5 0.3 - 0.9 K/mcL    Absolute Eos 0.0 (L) 0.1 - 0.5 K/mcL    Absolute  Baso 0.0 0.0 - 0.3 K/mcL    Absolute Immature Granulocytes 0.1 0 - 0.2 K/mcl   COMPREHENSIVE METABOLIC PANEL   Result Value Ref Range    Sodium 138 135 - 145 mmol/L    Potassium 3.4 3.4 - 5.1 mmol/L    Chloride 105 98 - 107 mmol/L    Carbon Dioxide 29 21 - 32 mmol/L    Anion Gap 7 (L) 10 - 20 mmol/L    Glucose 113 (H) 65 - 99 mg/dL    BUN 10 6 - 20 mg/dL    Creatinine 0.61 0.51 - 0.95 mg/dL    GFR Estimate,  >90     GFR Estimate, Non African American >90     BUN/Creatinine Ratio 16 7 - 25    CALCIUM 8.7 8.4 - 10.2 mg/dL    TOTAL BILIRUBIN 0.5 0.2 - 1.0 mg/dL    AST/SGOT 12 <38 Units/L    ALT/SGPT 17 <64 Units/L    ALK PHOSPHATASE 78 45 - 117 Units/L    TOTAL PROTEIN 7.2 6.4 - 8.2 g/dL    Albumin 3.4 (L) 3.6 - 5.1 g/dL    GLOBULIN 3.8 2.0 - 4.0 g/dL    A/G Ratio, Serum 0.9 (L) 1.0 - 2.4   Lipase   Result Value Ref Range    Lipase 64 (L) 73 - 393 Units/L   Magnesium   Result Value Ref Range    MAGNESIUM 2.3 1.7 - 2.4 mg/dL   URINALYSIS & REFLEX MICRO WITH CULTURE IF INDICATED   Result Value Ref Range    COLOR YELLOW YELLOW    APPEARANCE CLEAR     GLUCOSE(URINE) NEGATIVE NEGATIVE mg/dL    BILIRUBIN NEGATIVE NEGATIVE    KETONES 40 (A) NEGATIVE mg/dL    SPECIFIC GRAVITY >1.030 1.005 - 1.030    BLOOD NEGATIVE NEGATIVE    pH 5.5 5.0 - 7.0 Units    PROTEIN(URINE) NEGATIVE NEGATIVE mg/dL    UROBILINOGEN 0.2 0.0 - 1.0 mg/dL    NITRITE NEGATIVE NEGATIVE    LEUKOCYTE ESTERASE NEGATIVE NEGATIVE    SPECIMEN TYPE URINE CLEAN CATCH    Chem 8 Panel - Point of Care   Result Value Ref Range    Sodium  135 - 145 mmol/L    Potassium POC 3.5 3.4 - 5.1 mmol/L    Chloride POC 99 98 - 107 mmol/L    CALCIUM IONIZED-POC 1.12 (L) 1.15 - 1.29 mmol/L    CO2 Total 28 (H) 19 - 24 mmol/L    GLUCOSE  (H) 65 - 99 mg/dL    BUN POC 10 6 - 20 mg/dL    HEMATOCRIT POC 42.0 36.0 - 46.5 %    Hemoglobin POC 14.3 12.0 - 15.5 g/dL    ANION GAP POC 16 mmol/L    Creatinine POC 0.60 0.51 - 0.95 mg/dL    Estimated GFR   (POC) >90     Estimated GFR Non- (POC) >90    HCG Quantitative-point of care   Result Value Ref Range    HCG POC Quantitative <5.0 <5.0 IU/L       EKG Results     EKG Interpretation  Rate: 102  Rhythm: sinus tachycardia   Abnormality: no    When compared with EKG from 24-SEP-2017, no significant changes were observed.     EKG interpreted by ED physician    Radiology Results     Imaging Results          CT ABDOMEN PELVIS W CONTRAST w/ IV contrast only (Final result)  Result time 09/02/19 11:06:29    Final result                 Impression:    IMPRESSION:  1. Hypodensities within the left kidney most likely due to cortical cysts.  2. Bilateral multicystic septated complex cystic masses within the pelvis  with associated free fluid along the abdomen and pelvis.  Patient's clinical history of endometriosis these most likely represent  endometriomas and/or hemorrhagic cyst.  3. Limited visualization of the appendix which appears to be grossly  unremarkable.  2. Normal CT of the pelvis.                    Narrative:    CT ABDOMEN PELVIS W CONTRAST    DATE OF EXAM:  9/2/2019 10:09 AM.    HISTORY: Abd pain, appendicitis suspected    CONTRAST MATERIAL: Intravenous administration of 100  mL of Isovue-300.    PREVIOUS FOR COMPARISON: None.    TECHNIQUE: 3 mm axial images were obtained through the abdomen and pelvis.  From these images reformatted sagittal and coronal images were obtained.    ABDOMEN FINDINGS:    Scans  through the upper abdomen include the lung  bases, which are  clear.           The liver is normal in size and position.. The underlying attenuation of  the liver is normal.  No focal abnormalities are seen within the liver .    The spleen is unremarkable.    The gallbladder is normal in size and without evidence for cholelithiasis  or cholecystitis.    The pancreas is unremarkable.    The adrenal glands are normal and symmetrical.    There is good excretion of contrast by both kidneys. No renal  mass, calculi   or hydronephrosis is seen. A 1.5 cm smoothly delineated hypodensity is  seen along the mid pole of the left kidney (HU 20). This most likely  represents a cortical cyst. A smaller similar finding measuring 7 mm is  seen along the lower pole.     The abdominal aorta is normal in caliber.  No evidence for mass,  adenopathy, or ascites is seen within the abdomen.       PELVIS FINDINGS:    Scans through the pelvis reveal a large complex predominantly cystic masses  within the adnexal regions bilaterally, with a 6.0 x 5.3 x 6.4 cm mass on  the left, and a 4.7 x 2.6 x 4.2 cm mass on the right. In light of the  patient's clinical history this is most consistent with endometriosis.  There is a small amount of associated free fluid within the cul-de-sac and  along the inferior margin of the liver and spleen.     Evaluation of the bowel is limited due to the lack of oral contrast  material. No obvious inflammatory changes are  present.  The appendix is  difficult to visualize due to some gaseous distention of the colon and the  findings along the adnexal regions. It most likely appears to lie adjacent  to the right ovary and does not appear to be dilated or inflamed  There is  no obstruction or free air.    The uterus is unremarkable. The bladder outline is smooth.                                       ED Medication Orders (From admission, onward)    Start Ordered     Status Ordering Provider    09/02/19 0935 09/02/19 0934  LORazepam (ATIVAN) injection 0.5 mg  ONCE      Last MAR action:  Given THAO HERNANDES    09/02/19 0935 09/02/19 0934  ketorolac injection 15 mg  ONCE      Last MAR action:  Given THAO HERNANDES    09/02/19 0917 09/02/19 0916  sodium chloride (NORMAL SALINE) 0.9 % bolus 1,000 mL  ONCE      Last MAR action:  Completed THAO HERNANDES    09/02/19 0916 09/02/19 0916    EVERY 15 MIN PRN      Discontinued THAO HERNANDES    09/02/19 0912 09/02/19 0911    2 times per day      Discontinued THAO HERNANDES     09/02/19 0912 09/02/19 0911    ONCE      Discontinued THAO HERNANDES    09/02/19 0910 09/02/19 0911    ONCE PRN      Discontinued THAO HERNANDES    09/02/19 0910 09/02/19 0911    PRN      Discontinued THAO HERNANDES               MDM    Vitals  Vitals:    09/02/19 1030 09/02/19 1130 09/02/19 1230 09/02/19 1247   BP: 100/58 100/60 103/64 99/58   Pulse: 81 89 77    Resp:  21 20    Temp:       TempSrc:       SpO2: 97% 98% 97% 97%   Weight:       Height:           ED Course    Initial Impression: The pt with a hx of depression, DM, and IBS presents with RLQ abd pain. I plan to evaluate with lab work and abd CT. I plan to treat with IVF, Dilaudid, and Zofran. Pt understands and agrees to plan. All questions were addressed.     12:11 PM Patient recheck: The pt is resting comfortably in bed. Pt reports that she feels improved at this time. We discussed that the pt abd CT displayed evidence of free fluid which could be secondary to ovarian cyst vs endometriosis. I discussed with patient that no further ED workup is required pending UA. I discussed with patient that her symptoms are most consistent with probable ovarian cyst vs endometriosis. We discussed my plan to prescribe patient small course of pain medication, NSAID, and antiemetic. Patient was given ED warnings (new or worsening sx, intractable pain, intractable vomiting, high fever), plan of care instructions, and follow up information to go home with. The pt can f/u with their PCP regarding their ED visit today. Patient understands and agrees with plan. Any questions have been answered.     MDM    Pt with abd pain. She was found to have probable ruptured ovarian cyst and no other findings on objective w/u. Pt is improved with medication. She can f/u with her gynecologist.     PDMP reviewed; no aberrant behavior identified, prescription authorized.    Critical Care time spent on this patient outside of billable procedures:  None    Clinical Impression  ED Diagnoses         Final diagnoses    Generalized abdominal pain          Cysts of both ovaries          Endometriosis              The patient was provided with a recommendation to follow up with a primary care provider and obtain reassessment of his/her blood pressure within three months.    Follow Up:  Belkis Santiago MD  [address] [place] [id number]  [phone]    In 3 days      your gynecologist    In 3 days            Summary of your Discharge Medications      Take these Medications      Details   HYDROcodone-acetaminophen 5-325 MG per tablet  Commonly known as:  NORCO   Take 1 tablet by mouth every 6 hours as needed for Pain.     ibuprofen 600 MG tablet  Commonly known as:  MOTRIN   Take 1 tablet by mouth 3 times daily.     ondansetron 4 MG disintegrating tablet  Commonly known as:  ZOFRAN ODT   Place 1 tablet onto the tongue every 6 hours.          Pt is discharged to home/self care in stable condition.     I have reviewed the information recorded by the scribe for accuracy and agree with its contents.    ____________________________________________________________________    Stephen Watt acting as a scribe for MD Dr. Don Swift MD  Dictation # [id number]  Scribe: Stephen Fonseca MD  09/02/19 1000     : Yes

## 2023-03-21 NOTE — ASU DISCHARGE PLAN (ADULT/PEDIATRIC) - NS MD DC FALL RISK RISK
For information on Fall & Injury Prevention, visit: [email or web address] OR  [email or web address] OR  https://www.cdc.gov/steadi/patient.html

## 2023-03-21 NOTE — ASU DISCHARGE PLAN (ADULT/PEDIATRIC) - OK TO LEAVE MESSAGE ON VOICEMAIL
Patient: Nuha Lees    * No procedures listed *    Diagnosis:Severe recurrent major depression without psychotic features (H) [F33.2]  Diagnosis Additional Information: No value filed.    Anesthesia Type:  General    Note:  Anesthesia Post Evaluation    Patient location during evaluation: PACU and Bedside  Patient participation: Unable to participate in evaluation secondary to underlying medical condition  Level of consciousness: awake  Pain management: adequate  Airway patency: patent  Cardiovascular status: acceptable  Respiratory status: acceptable  Hydration status: acceptable  PONV: none     Anesthetic complications: None          Last vitals:  Vitals:    07/21/17 0829 07/21/17 0839 07/21/17 0849   BP: 105/62 100/54 101/56   Pulse: 109 84    Resp: 16 16    Temp:      SpO2: 100% 96% 96%         Electronically Signed By: Rachel Willard MD  July 21, 2017  9:05 AM  
Yes

## 2023-03-24 DIAGNOSIS — Z87.891 PERSONAL HISTORY OF NICOTINE DEPENDENCE: ICD-10-CM

## 2023-03-24 DIAGNOSIS — N20.1 CALCULUS OF URETER: ICD-10-CM

## 2023-03-24 DIAGNOSIS — K21.9 GASTRO-ESOPHAGEAL REFLUX DISEASE WITHOUT ESOPHAGITIS: ICD-10-CM

## 2023-04-20 NOTE — ED PROVIDER NOTE - OBJECTIVE STATEMENT
Initial (On Arrival)
52-year-old male with past history of nephrolithiasis requiring lithotripsy in the past status post lithotripsy within the past month, GERD presents with right flank plain that started this morning. Patient denies any dysuria, hematuria, fever, chills.

## 2023-04-24 ENCOUNTER — APPOINTMENT (OUTPATIENT)
Dept: UROLOGY | Facility: CLINIC | Age: 53
End: 2023-04-24

## 2023-10-02 NOTE — ED PROVIDER NOTE - NSICDXPASTMEDICALHX_GEN_ALL_CORE_FT
PAST MEDICAL HISTORY:  Former smoker     GERD (gastroesophageal reflux disease)     Kidney stones      Cimetidine Counseling:  I discussed with the patient the risks of Cimetidine including but not limited to gynecomastia, headache, diarrhea, nausea, drowsiness, arrhythmias, pancreatitis, skin rashes, psychosis, bone marrow suppression and kidney toxicity.

## 2023-10-26 ENCOUNTER — OUTPATIENT (OUTPATIENT)
Dept: OUTPATIENT SERVICES | Facility: HOSPITAL | Age: 53
LOS: 1 days | End: 2023-10-26
Payer: MEDICAID

## 2023-10-26 ENCOUNTER — RESULT REVIEW (OUTPATIENT)
Age: 53
End: 2023-10-26

## 2023-10-26 DIAGNOSIS — Z98.890 OTHER SPECIFIED POSTPROCEDURAL STATES: Chronic | ICD-10-CM

## 2023-10-26 DIAGNOSIS — N20.1 CALCULUS OF URETER: ICD-10-CM

## 2023-10-26 PROCEDURE — 74019 RADEX ABDOMEN 2 VIEWS: CPT | Mod: 26

## 2023-10-26 PROCEDURE — 74019 RADEX ABDOMEN 2 VIEWS: CPT

## 2023-10-27 ENCOUNTER — NON-APPOINTMENT (OUTPATIENT)
Age: 53
End: 2023-10-27

## 2023-10-27 DIAGNOSIS — N20.1 CALCULUS OF URETER: ICD-10-CM

## 2023-11-15 ENCOUNTER — APPOINTMENT (OUTPATIENT)
Dept: UROLOGY | Facility: CLINIC | Age: 53
End: 2023-11-15
Payer: MEDICAID

## 2023-11-15 VITALS
BODY MASS INDEX: 29.35 KG/M2 | HEIGHT: 70 IN | TEMPERATURE: 98.3 F | SYSTOLIC BLOOD PRESSURE: 127 MMHG | DIASTOLIC BLOOD PRESSURE: 88 MMHG | HEART RATE: 84 BPM | WEIGHT: 205 LBS

## 2023-11-15 DIAGNOSIS — N20.1 CALCULUS OF URETER: ICD-10-CM

## 2023-11-15 PROCEDURE — 99213 OFFICE O/P EST LOW 20 MIN: CPT

## 2024-01-31 ENCOUNTER — OUTPATIENT (OUTPATIENT)
Dept: OUTPATIENT SERVICES | Facility: HOSPITAL | Age: 54
LOS: 1 days | End: 2024-01-31
Payer: MEDICAID

## 2024-01-31 DIAGNOSIS — Z98.890 OTHER SPECIFIED POSTPROCEDURAL STATES: Chronic | ICD-10-CM

## 2024-01-31 DIAGNOSIS — M54.9 DORSALGIA, UNSPECIFIED: ICD-10-CM

## 2024-01-31 PROCEDURE — 73502 X-RAY EXAM HIP UNI 2-3 VIEWS: CPT | Mod: RT

## 2024-01-31 PROCEDURE — 72110 X-RAY EXAM L-2 SPINE 4/>VWS: CPT | Mod: 26

## 2024-01-31 PROCEDURE — 73552 X-RAY EXAM OF FEMUR 2/>: CPT | Mod: 26,RT

## 2024-01-31 PROCEDURE — 73502 X-RAY EXAM HIP UNI 2-3 VIEWS: CPT | Mod: 26,RT

## 2024-01-31 PROCEDURE — 72110 X-RAY EXAM L-2 SPINE 4/>VWS: CPT

## 2024-01-31 PROCEDURE — 73552 X-RAY EXAM OF FEMUR 2/>: CPT | Mod: RT

## 2024-02-01 DIAGNOSIS — M54.9 DORSALGIA, UNSPECIFIED: ICD-10-CM

## 2024-03-12 ENCOUNTER — OUTPATIENT (OUTPATIENT)
Dept: OUTPATIENT SERVICES | Facility: HOSPITAL | Age: 54
LOS: 1 days | End: 2024-03-12
Payer: MEDICAID

## 2024-03-12 DIAGNOSIS — Z98.890 OTHER SPECIFIED POSTPROCEDURAL STATES: Chronic | ICD-10-CM

## 2024-03-12 DIAGNOSIS — R10.9 UNSPECIFIED ABDOMINAL PAIN: ICD-10-CM

## 2024-03-12 DIAGNOSIS — Z00.8 ENCOUNTER FOR OTHER GENERAL EXAMINATION: ICD-10-CM

## 2024-03-12 PROCEDURE — 76700 US EXAM ABDOM COMPLETE: CPT

## 2024-03-12 PROCEDURE — 76700 US EXAM ABDOM COMPLETE: CPT | Mod: 26

## 2024-03-13 DIAGNOSIS — R10.9 UNSPECIFIED ABDOMINAL PAIN: ICD-10-CM

## 2024-04-04 NOTE — ASU DISCHARGE PLAN (ADULT/PEDIATRIC) - ASU DC SPECIAL INSTRUCTIONSFT
see office sheets  strain urine Pt c/o "rt flank pain, pain/burning/dribbling with urination. PMH: Rt kidney transplant"

## 2024-06-27 ENCOUNTER — OUTPATIENT (OUTPATIENT)
Dept: OUTPATIENT SERVICES | Facility: HOSPITAL | Age: 54
LOS: 1 days | End: 2024-06-27
Payer: COMMERCIAL

## 2024-06-27 DIAGNOSIS — N20.1 CALCULUS OF URETER: ICD-10-CM

## 2024-06-27 DIAGNOSIS — Z98.890 OTHER SPECIFIED POSTPROCEDURAL STATES: Chronic | ICD-10-CM

## 2024-06-27 DIAGNOSIS — Z00.8 ENCOUNTER FOR OTHER GENERAL EXAMINATION: ICD-10-CM

## 2024-06-27 PROCEDURE — 74176 CT ABD & PELVIS W/O CONTRAST: CPT

## 2024-06-27 PROCEDURE — 74176 CT ABD & PELVIS W/O CONTRAST: CPT | Mod: 26

## 2024-06-28 ENCOUNTER — NON-APPOINTMENT (OUTPATIENT)
Age: 54
End: 2024-06-28

## 2024-06-28 DIAGNOSIS — N20.1 CALCULUS OF URETER: ICD-10-CM

## 2024-07-21 NOTE — LETTER BODY
"  [facility]        NAME: Adolfo Bond is a 52 y.o. male  : 1971    MRN: [id number]  DATE: 2024  TIME: 10:19 AM    Assessment and Plan   Cellulitis of right upper extremity [L03.113]  1. Cellulitis of right upper extremity  cephalexin (KEFLEX) 500 mg capsule            Patient Instructions     Take the antibiotic as prescribed  Cool compresses to the arm  Tylenol Motrin for pain or discomfort  If the area gets larger if you develop chills or fever go to the emergency room  Follow up with PCP in 3-5 days.  Proceed to  ER if symptoms worsen.    If tests have been performed at [facility], our office will contact you with results if changes need to be made to the care plan discussed with you at the visit.  You can review your full results on [facility].    Chief Complaint     Chief Complaint   Patient presents with    Insect Bite     Patient states that a hornet stung him on Friday on his right arm. The site is read and swollen. He has been icing it and took some benadryl.          History of Present Illness       Insect Bite        Review of Systems   Review of Systems      Current Medications       Current Outpatient Medications:     cephalexin (KEFLEX) 500 mg capsule, Take 1 capsule (500 mg total) by mouth every 6 (six) hours for 7 days, Disp: 28 capsule, Rfl: 0    Current Allergies     Allergies as of 2024    (No Known Allergies)            The following portions of the patient's history were reviewed and updated as appropriate: allergies, current medications, past family history, past medical history, past social history, past surgical history and problem list.     No past medical history on file.    No past surgical history on file.    No family history on file.      Medications have been verified.        Objective   /76   Pulse 79   Temp 98 °F (36.7 °C)   Ht 5' 5\" (1.651 m)   Wt 81.6 kg (180 lb)   SpO2 98%   BMI 29.95 kg/m²   No LMP for male patient.     "   Physical Exam     Physical Exam               [Dear  ___] : Dear  [unfilled], [Courtesy Letter:] : I had the pleasure of seeing your patient, [unfilled], in my office today. [Please see my note below.] : Please see my note below. [Sincerely,] : Sincerely, [FreeTextEntry2] : Prince Hoyt, DO\[address]\[place] [id number]

## 2024-07-29 ENCOUNTER — APPOINTMENT (OUTPATIENT)
Dept: UROLOGY | Facility: CLINIC | Age: 54
End: 2024-07-29
Payer: COMMERCIAL

## 2024-07-29 VITALS
HEIGHT: 70 IN | BODY MASS INDEX: 29.26 KG/M2 | HEART RATE: 61 BPM | DIASTOLIC BLOOD PRESSURE: 56 MMHG | TEMPERATURE: 98.2 F | RESPIRATION RATE: 16 BRPM | SYSTOLIC BLOOD PRESSURE: 144 MMHG | WEIGHT: 204.38 LBS | OXYGEN SATURATION: 96 %

## 2024-07-29 DIAGNOSIS — N20.1 CALCULUS OF URETER: ICD-10-CM

## 2024-07-29 DIAGNOSIS — N20.0 CALCULUS OF KIDNEY: ICD-10-CM

## 2024-07-29 DIAGNOSIS — Z00.00 ENCOUNTER FOR GENERAL ADULT MEDICAL EXAMINATION W/OUT ABNORMAL FINDINGS: ICD-10-CM

## 2024-07-29 PROCEDURE — 99214 OFFICE O/P EST MOD 30 MIN: CPT

## 2024-07-29 PROCEDURE — G2211 COMPLEX E/M VISIT ADD ON: CPT

## 2024-07-29 NOTE — HISTORY OF PRESENT ILLNESS
[None] : no symptoms [FreeTextEntry1] : Saúl is a 53-year-old male born October 22, 1970 that we have been treating over many years for recurrent stones.  He does follow-up with her nephrologist but he still keeps making stones.  He had shockwave lithotripsy in March we saw him in November he was just too busy over the summer to come in wanted a CAT scan in March 2024 but he was too busy to come and.  He comes in today 16-month if they are being treated 8 months after being seen telling me that he is doing okay.  Correct went to the CAT scan I got at his June 27  CAT scan was done June 27, 2024 He has 4 punctate right renal calcifications largest of which is 2 mm there is a left lower pole cyst but no other stones.

## 2024-07-29 NOTE — PHYSICAL EXAM
[General Appearance - Well Developed] : well developed [General Appearance - Well Nourished] : well nourished [Normal Appearance] : normal appearance [Well Groomed] : well groomed [General Appearance - In No Acute Distress] : no acute distress [Edema] : no peripheral edema [Respiration, Rhythm And Depth] : normal respiratory rhythm and effort [Exaggerated Use Of Accessory Muscles For Inspiration] : no accessory muscle use [Abdomen Soft] : soft [Abdomen Tenderness] : non-tender [Costovertebral Angle Tenderness] : no ~M costovertebral angle tenderness [Normal Station and Gait] : the gait and station were normal for the patient's age [] : no rash [No Focal Deficits] : no focal deficits [Oriented To Time, Place, And Person] : oriented to person, place, and time [Affect] : the affect was normal [Mood] : the mood was normal [Not Anxious] : not anxious [FreeTextEntry1] : 29.32

## 2024-07-29 NOTE — LETTER HEADER
[FreeTextEntry3] : Maria D Mariscal MD [facility] [place], [address] [place] [address]

## 2024-07-29 NOTE — ASSESSMENT
[FreeTextEntry1] : I do not know if these for tiny flecks are residual from the stone that we broke up I do not know if he is making new ones and if I see him once a year with tests being done 3 to 6 months after I think they be useful I do not know if I will ever be able to get Bryanna answer.  All I can say right now reason is that as of June he had nothing that needed to be treated.  He stopped going to Dr. Brown Morgan because he says I told him not to.  I explained that I do not dislike her I does do not send to her because I do not get reports from her she is a very qualified nephrologist if he has a good relationship with her should continue on if he is not going to see her he needs to see someone as he is only 53 to keep having kidney stone surgery is not a great thing.  Last metabolic workup we had was almost 2 years ago.  That was done in October 17, 2022 and showed a high calcium high oxalate high uric acid high sodium all the things that he had been told to try and avoid.  I will order another 1 which she will do after the summer but I wanted to see a nephrologist as he needs more preventative maintenance that I can give him.  Please note I have informed him that as of April I would leave that we have time or retired completely that subsequent hospital will reintroduce stone physician that the hospital has hired I will have to see if he is comfortable with him if I end up not staying or go elsewhere.

## 2024-07-29 NOTE — LETTER BODY
[Dear  ___] : Dear  [unfilled], [Courtesy Letter:] : I had the pleasure of seeing your patient, [unfilled], in my office today. [Please see my note below.] : Please see my note below. [Sincerely,] : Sincerely, [FreeTextEntry2] : Prince Hoyt, \par  [address] [place] [address]\[place] [phone]

## 2024-08-08 LAB
KIDNEY STONE SOURCE: NORMAL
NIDUS STONE QN: NORMAL
RESULT COMMENT: NORMAL

## 2024-08-15 ENCOUNTER — APPOINTMENT (OUTPATIENT)
Dept: NEPHROLOGY | Facility: CLINIC | Age: 54
End: 2024-08-15

## 2024-08-19 ENCOUNTER — APPOINTMENT (OUTPATIENT)
Dept: NEPHROLOGY | Facility: CLINIC | Age: 54
End: 2024-08-19
Payer: COMMERCIAL

## 2024-08-19 VITALS
OXYGEN SATURATION: 97 % | SYSTOLIC BLOOD PRESSURE: 125 MMHG | BODY MASS INDEX: 28.63 KG/M2 | HEART RATE: 67 BPM | HEIGHT: 70 IN | DIASTOLIC BLOOD PRESSURE: 85 MMHG | WEIGHT: 200 LBS

## 2024-08-19 DIAGNOSIS — N20.0 CALCULUS OF KIDNEY: ICD-10-CM

## 2024-08-19 LAB
BILIRUB UR QL STRIP: NORMAL
CLARITY UR: CLEAR
COLLECTION METHOD: NORMAL
GLUCOSE UR-MCNC: NORMAL
HCG UR QL: 0.2 EU/DL
HGB UR QL STRIP.AUTO: NORMAL
KETONES UR-MCNC: NORMAL
LEUKOCYTE ESTERASE UR QL STRIP: NORMAL
NITRITE UR QL STRIP: NORMAL
PH UR STRIP: 6
PROT UR STRIP-MCNC: NORMAL
SP GR UR STRIP: 1.02

## 2024-08-19 PROCEDURE — 81003 URINALYSIS AUTO W/O SCOPE: CPT | Mod: QW

## 2024-08-19 PROCEDURE — 99204 OFFICE O/P NEW MOD 45 MIN: CPT

## 2024-08-19 RX ORDER — POTASSIUM CITRATE 10 MEQ/1
10 MEQ TABLET, EXTENDED RELEASE ORAL TWICE DAILY
Qty: 180 | Refills: 1 | Status: ACTIVE | COMMUNITY
Start: 2024-08-19 | End: 1900-01-01

## 2024-08-19 NOTE — ASSESSMENT
[FreeTextEntry1] : #) Nephrolithiasis: - Advised to increase water intake to 96 oz. (2.5-3L) Daily - Low intake of salt, sugar and animal protein advised - Advised to increase intake of potassium, citrate and elemental calcium from dairy-based sources - Low intake of high oxalate-containing foods, including dark green leafy vegetables, nuts, berries, black tea, and dark cocoa. Dietary guidelines provided to patient at the end of appointment. - Litholink 24 hour urine testing ordered and to be performed - Labwork ordered for PTH, PTHrp, Vit D 25-OH, Vit D 1,25 OH, Uric Acid, CMP, HgbA1c - Will begin on Potassium Citrate 10mEq BID, attempt to titrate up to 45 mL daily depending on effect on potassium levels with this dose

## 2024-08-19 NOTE — HISTORY OF PRESENT ILLNESS
[FreeTextEntry1] : Saúl Bardales is a 52 yo M with history of recurrent kidney stones, now referred to Nephrology for prevention and management of Nephrolithiasis.  He was recently seen by his urologist in July 2024, most recent CT in June 2024 noted 4 right renal calcifications with the largest of 2mm noted.  Recent stone analysis noted an 80% CaOxM and 20% CaOxD stone composition.  The patient notes no prior Renal evaluation or history of nephritis, nephrosis, recent skin or throat infection or abnormal rash. The patient denied chronic UTI's, bladder infections, cystitis, or pyelonephritis. No known gross hematuria or proteinuria.   Recent Hospitalizations: none  Recent Medications changes: none  NSAIDS: denies use  Home BP: no abnormal readings noted  Home FSBS:  no abnormal readings noted  LUTS: denies LUTS, foamy urine or hematuria  Uremic Symptoms: no pruritis, metallic taste, new onset weakness, dysphagia, poor appetite, or tremors noted  FamHx CKD/RRT:  denies  Personal History of CKD/RRT:  denies

## 2024-09-20 ENCOUNTER — OUTPATIENT (OUTPATIENT)
Dept: OUTPATIENT SERVICES | Facility: HOSPITAL | Age: 54
LOS: 1 days | End: 2024-09-20
Payer: MEDICAID

## 2024-09-20 DIAGNOSIS — Z98.890 OTHER SPECIFIED POSTPROCEDURAL STATES: Chronic | ICD-10-CM

## 2024-09-20 DIAGNOSIS — M79.643 PAIN IN UNSPECIFIED HAND: ICD-10-CM

## 2024-09-20 PROCEDURE — 73130 X-RAY EXAM OF HAND: CPT | Mod: 50

## 2024-09-20 PROCEDURE — 73130 X-RAY EXAM OF HAND: CPT | Mod: 26,50

## 2024-09-21 DIAGNOSIS — M79.643 PAIN IN UNSPECIFIED HAND: ICD-10-CM

## 2024-09-26 ENCOUNTER — APPOINTMENT (OUTPATIENT)
Dept: UROLOGY | Facility: CLINIC | Age: 54
End: 2024-09-26
Payer: COMMERCIAL

## 2024-09-26 VITALS
DIASTOLIC BLOOD PRESSURE: 81 MMHG | WEIGHT: 206 LBS | TEMPERATURE: 98 F | SYSTOLIC BLOOD PRESSURE: 135 MMHG | BODY MASS INDEX: 29.49 KG/M2 | HEIGHT: 70 IN

## 2024-09-26 DIAGNOSIS — Z00.00 ENCOUNTER FOR GENERAL ADULT MEDICAL EXAMINATION W/OUT ABNORMAL FINDINGS: ICD-10-CM

## 2024-09-26 PROCEDURE — 99213 OFFICE O/P EST LOW 20 MIN: CPT

## 2024-09-26 PROCEDURE — G2211 COMPLEX E/M VISIT ADD ON: CPT

## 2024-09-26 NOTE — HISTORY OF PRESENT ILLNESS
[FreeTextEntry1] : Saúl is a 53-year-old male born October 22, 1970.  We have been treating for many years for recurrent stones.  His last lithotripsy was in March 2023 at his last visit was in July 2024.  He is now seeing the nephrologist Dr. Hurtado, has been given instructions that he says he finally understands he has been started on potassium citrate and the last metabolic workup was done on August 28. Volume was 2.18 L which is good with the Calcium was 307 (less than 250) and the Oxalate was 78!!!  (20-40) and that gave him a Calcium oxalate supersaturation of 11.86 (6/10) Citrate was great at 989 the best it has been pH was just a little bit too much at 7.005 and even though his Uric acid was 1.061 his Uric acid supersaturation was only 0.12 but given his Phosphorus of 1.243 his Calcium phosphate supersaturation was 3.63 (0.5-2) Sodium 193 was too high ( but the inhibitors of Potassium and 99 and 1 magnesium 182 were good PCR was 1.1

## 2024-09-26 NOTE — ASSESSMENT
[FreeTextEntry1] : Metabolic workup is terrible.  Almost everything that he can do is to increase the chance of making a stone is happening except for the volume and the citrate level.  His calcium is not the highest he has been but still high while his oxalate is through the roof, his uric acid load is of the worst he has had as is his phosphorus and his salt load.  We will see what the nephrologist has to say but I think he needs to see a nutritionist that specializes in stone prevention.  I do not know of anyone there may not be 1 of this plan but there are some on the Internet you do Zoom sessions with them and you pay them out-of-pocket.  Given the pain he has had from the stones in the mouth will most work it may be a course defected use of his money but I cannot tell him what to do I can only make suggestions.  From my viewpoint his last study was 3 months ago, he is going to see the nephrologist next week I will get an ultrasound in 3 months which would be 6 months from the last study and see him in follow-up.  If at any point he gets colic we will end up with a stat CAT scan but hopefully that will happen.  To summarize I think he needs to see a nutritionist we will discuss that with the nephrologist We will get an ultrasound of the end of December and he will see me after the ultrasound

## 2024-09-26 NOTE — LETTER BODY
[Dear  ___] : Dear  [unfilled], [Courtesy Letter:] : I had the pleasure of seeing your patient, [unfilled], in my office today. [Please see my note below.] : Please see my note below. [Sincerely,] : Sincerely, [FreeTextEntry2] : Prince Hoyt, \par  [address] [place] [address]\[place] [phone]

## 2024-09-26 NOTE — LETTER HEADER
[FreeTextEntry3] : Maria D Mariscal MD [facility], [address] [place] [address]

## 2024-09-30 ENCOUNTER — APPOINTMENT (OUTPATIENT)
Dept: NEPHROLOGY | Facility: CLINIC | Age: 54
End: 2024-09-30
Payer: COMMERCIAL

## 2024-09-30 VITALS
SYSTOLIC BLOOD PRESSURE: 130 MMHG | HEIGHT: 70 IN | WEIGHT: 205 LBS | DIASTOLIC BLOOD PRESSURE: 80 MMHG | HEART RATE: 57 BPM | BODY MASS INDEX: 29.35 KG/M2 | OXYGEN SATURATION: 97 %

## 2024-09-30 DIAGNOSIS — E55.9 VITAMIN D DEFICIENCY, UNSPECIFIED: ICD-10-CM

## 2024-09-30 DIAGNOSIS — E67.3 HYPERVITAMINOSIS D: ICD-10-CM

## 2024-09-30 DIAGNOSIS — E78.5 HYPERLIPIDEMIA, UNSPECIFIED: ICD-10-CM

## 2024-09-30 DIAGNOSIS — E21.3 HYPERPARATHYROIDISM, UNSPECIFIED: ICD-10-CM

## 2024-09-30 LAB
BILIRUB UR QL STRIP: NORMAL
CLARITY UR: CLEAR
COLLECTION METHOD: NORMAL
GLUCOSE UR-MCNC: NORMAL
HCG UR QL: 0.2 EU/DL
HGB UR QL STRIP.AUTO: NORMAL
KETONES UR-MCNC: NORMAL
LEUKOCYTE ESTERASE UR QL STRIP: NORMAL
NITRITE UR QL STRIP: NORMAL
PH UR STRIP: 6.5
PROT UR STRIP-MCNC: NORMAL
SP GR UR STRIP: 1.02

## 2024-09-30 PROCEDURE — 99214 OFFICE O/P EST MOD 30 MIN: CPT

## 2024-09-30 PROCEDURE — 81003 URINALYSIS AUTO W/O SCOPE: CPT | Mod: QW

## 2024-09-30 RX ORDER — HYDROCHLOROTHIAZIDE 12.5 MG/1
12.5 CAPSULE ORAL
Qty: 90 | Refills: 1 | Status: ACTIVE | COMMUNITY
Start: 2024-09-30 | End: 1900-01-01

## 2024-09-30 RX ORDER — ATORVASTATIN CALCIUM 10 MG/1
10 TABLET, FILM COATED ORAL
Qty: 90 | Refills: 1 | Status: ACTIVE | COMMUNITY
Start: 2024-09-30 | End: 1900-01-01

## 2024-09-30 NOTE — HISTORY OF PRESENT ILLNESS
[FreeTextEntry1] : Saúl Bardales is a 54 yo M with history of recurrent kidney stones, seen in follow-up for prevention and management of Nephrolithiasis.  Recent stone analysis noted an 80% CaOxM and 20% CaOxD stone composition.  Recent labwork noted an elevated PTH and Vit D 1,25OH level.  Litholink performed earlier this month also noted an elevated urine calcium, oxalate, uric acid and sodium value all contributing to his stone risk.  Recent Hospitalizations: none  Recent Medications changes: none  NSAIDS: denies use  Home BP: no abnormal readings noted  Home FSBS:  no abnormal readings noted  LUTS: denies LUTS, foamy urine or hematuria  Uremic Symptoms: no pruritis, metallic taste, new onset weakness, dysphagia, poor appetite, or tremors noted  FamHx CKD/RRT:  denies  Personal History of CKD/RRT:  denies

## 2024-09-30 NOTE — ASSESSMENT
[FreeTextEntry1] : #) Nephrolithiasis: - Advised to increase water intake to 96 oz. (2.5-3L) Daily - Low intake of salt, sugar and animal protein advised - Advised to increase intake of potassium, citrate and elemental calcium from dairy-based sources - Low intake of high oxalate-containing foods, including dark green leafy vegetables, nuts, berries, black tea, and dark cocoa. Dietary guidelines provided to patient at the end of appointment. - Litholink 24 hour urine testing ordered and to be performed in 3.5 months - continue K-Cit 10mEq BID  #) Dyslipidemia: LDL uncontrolled, TriG wnl. - Starting Lipitor 10mg daily  #) Hyperparathyroidism/Elevated Vitamin D Noted high PTH and high Calcitriol - sending for 4D Parathyroid CT scan - sending labwork for PTH, Calcitriol, ACE, BMP - if any abnormalities noted will send to Endocrine for further evaluation

## 2024-10-01 ENCOUNTER — EMERGENCY (EMERGENCY)
Facility: HOSPITAL | Age: 54
LOS: 0 days | Discharge: HOME | End: 2024-10-01
Attending: EMERGENCY MEDICINE
Payer: COMMERCIAL

## 2024-10-01 VITALS
HEART RATE: 57 BPM | DIASTOLIC BLOOD PRESSURE: 87 MMHG | SYSTOLIC BLOOD PRESSURE: 151 MMHG | OXYGEN SATURATION: 98 % | HEIGHT: 70 IN | TEMPERATURE: 98 F | RESPIRATION RATE: 18 BRPM | WEIGHT: 205.03 LBS

## 2024-10-01 DIAGNOSIS — Z98.890 OTHER SPECIFIED POSTPROCEDURAL STATES: Chronic | ICD-10-CM

## 2024-10-01 DIAGNOSIS — Z91.010 ALLERGY TO PEANUTS: ICD-10-CM

## 2024-10-01 DIAGNOSIS — S60.212A CONTUSION OF LEFT WRIST, INITIAL ENCOUNTER: ICD-10-CM

## 2024-10-01 DIAGNOSIS — M25.511 PAIN IN RIGHT SHOULDER: ICD-10-CM
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
## 2024-10-01 DIAGNOSIS — S43.101A UNSPECIFIED DISLOCATION OF RIGHT ACROMIOCLAVICULAR JOINT, INITIAL ENCOUNTER: ICD-10-CM

## 2024-10-01 DIAGNOSIS — M79.641 PAIN IN RIGHT HAND: ICD-10-CM
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
## 2024-10-01 PROCEDURE — 73030 X-RAY EXAM OF SHOULDER: CPT | Mod: 26,RT

## 2024-10-01 PROCEDURE — 73030 X-RAY EXAM OF SHOULDER: CPT | Mod: RT

## 2024-10-01 PROCEDURE — 99284 EMERGENCY DEPT VISIT MOD MDM: CPT | Mod: 25

## 2024-10-01 PROCEDURE — 73110 X-RAY EXAM OF WRIST: CPT | Mod: 50

## 2024-10-01 PROCEDURE — 99284 EMERGENCY DEPT VISIT MOD MDM: CPT

## 2024-10-01 PROCEDURE — 73110 X-RAY EXAM OF WRIST: CPT | Mod: 26,50

## 2024-10-01 NOTE — ED PROVIDER NOTE - ATTENDING APP SHARED VISIT CONTRIBUTION OF CARE
see mdm. Propranolol Pregnancy And Lactation Text: This medication is Pregnancy Category C and it isn't known if it is safe during pregnancy. It is excreted in breast milk.

## 2024-10-01 NOTE — ED PROVIDER NOTE - PHYSICAL EXAMINATION
CONST: Well appearing in NAD  EYES: PERRL, EOMI, Sclera and conjunctiva clear.   ENT: Oropharynx normal appearing, no erythema or exudates. Uvula midline.  CARD: Normal S1 S2; Normal rate and rhythm  RESP: Equal BS B/L, No wheezes, rhonchi or rales. No distress  GI: Soft, non-tender, non-distended.  MS: R shoulder tenderness to PP over the R AC joint. Pain w/ AROM. NV intact. Small hematoma to L wrist. NV intact. No snuff box tenderness b/l.   SKIN: Warm, dry, no acute rashes.   NEURO: A&Ox3, No focal deficits. Strength 5/5 with no sensory deficits. Steady gait

## 2024-10-01 NOTE — ED PROVIDER NOTE - NSFOLLOWUPINSTRUCTIONS_ED_ALL_ED_FT
Our Emergency Department Referral Coordinators will be reaching out to you in the next 24-48 hours from 9:00am to 5:00pm to schedule a follow up appointment. Please expect a phone call from the hospital in that time frame. If you do not receive a call or if you have any questions or concerns, you can reach them at   [phone]    Acromioclavicular Separation    WHAT YOU NEED TO KNOW:    What is an acromioclavicular separation? An acromioclavicular separation (AS), or shoulder separation, is when your shoulder and collarbone move or come apart. The bones move or come apart because the ligaments that hold the bones in place are stretched or torn.    What are the signs and symptoms of an AS?    Pain    Abnormally shaped shoulder    Lump on top of the shoulder  How is an AS diagnosed? Your healthcare provider can usually diagnose an AS because of the abnormal shape of your shoulder. You may also need x-rays to show the separation.    How is an AS treated?    Acetaminophen decreases pain and is available without a doctor's order. Ask how much to take and how often to take it. Follow directions. Acetaminophen can cause liver damage if not taken correctly.    NSAIDs help decrease swelling and pain. This medicine is available with or without a doctor's order. NSAIDs can cause stomach bleeding or kidney problems in certain people. If you take blood thinner medicine, always ask your healthcare provider if NSAIDs are safe for you. Always read the medicine label and follow directions.    A Tetanus (Td) vaccine may be needed if you have an open wound. This vaccine is a booster shot used to help prevent diphtheria and tetanus.  How can I manage my symptoms?    Apply ice. Apply ice on your injury for 15 to 20 minutes every hour for the first 1 to 2 days. Use an ice pack, or put crushed ice in a plastic bag. Cover it with a towel. Ice helps prevent tissue damage and decreases swelling and pain.    Apply heat. Apply heat on your injury for 20 to 30 minutes every 2 hours after the first 1 to 2 days. Heat helps decrease pain and muscle spasms.    Wear your support device. You may need to wear a strap, elastic bandage, or sling. These devices keep your shoulder in the correct position so it can heal.  Wear the strap or sling constantly for 6 to 8 weeks, even when you sleep. You may remove the strap or sling when you bathe. Do not move your shoulder or arm when the strap or sling is off. Do not lift your arm.    The strap or sling must be tightened by another person every day. Tighten it enough to keep your shoulders back in the correct posture. Tell the person to allow enough room to fit an index finger between your body and the strap. Put a folded wash cloth in your armpit to prevent pressure on the nerves by the strap. Loosen the strap if you feel numbness or tingling in your arm or hand.    Rest your shoulder as much as possible to decrease swelling and help it heal.  When should I contact my healthcare provider?    You have a fever.    You have worse pain, even after you take medicine.    You have an open wound that is red, swollen, or draining pus.    Your arm or hand becomes numb or tingles.    You have questions or concerns about your condition or care.  When should I seek immediate care or call 911?    You lose feeling in your arm or hand.    You cannot move your arm or hand.  CARE AGREEMENT:    You have the right to help plan your care. Learn about your health condition and how it may be treated. Discuss treatment options with your healthcare providers to decide what care you want to receive. You always have the right to refuse treatment.

## 2024-10-01 NOTE — ED ADULT NURSE NOTE - DISCHARGE DATE/TIME
Reviewing charts, his colonoscopy will be due in November.  Is it okay if we start getting that set up for him, and if so does he have a preference where he goes/   01-Oct-2024 19:02

## 2024-10-01 NOTE — ED PROVIDER NOTE - OBJECTIVE STATEMENT
53-year-old male presents to the ED for evaluation of fall.  Patient nonsyncopal mechanical fall from standing recent fall with bilateral wrist.  Sustained injury to right shoulder and pain with active range of motion. No HS, LOC, AC use, back pain, neck pain, abd pain. CP.

## 2024-10-01 NOTE — ED PROVIDER NOTE - NSFOLLOWUPCLINICS_GEN_ALL_ED_FT
[facility]  Orthpedic  [address], [place]   Phone: [phone]  Fax:   Follow Up Time: Routine

## 2024-10-01 NOTE — ED PROVIDER NOTE - CLINICAL SUMMARY MEDICAL DECISION MAKING FREE TEXT BOX
53-year-old man in ER for evaluation after fall earlier today.  Patient states he tripped and fell onto R shoulder as well as bilateral hands.  Planing of pain to R shoulder and L > R hand pain.  Did not hit head, no LOC.  No HA, no neck pain.  No CP/SOB.  No abdominal pain.  No back pain.  No lower extremity pain/injury.  No motor weakness or paresthesias.  Not any AC meds.  PE - nad, nc/at, eomi, perrl, op - clear, mmm, no C-spine tenderness, cta b/l, no w/r/r, rrr, abd- soft, nt/nd, nabs, from x 4, RUE+ Tenderness to shoulder/AC joint, + able to range at shoulder, + mild tenderness to wrist, no swelling, LUE: Mild tenderness to wrist, able to range, no deformities, + mild swelling, , no LE swelling/tenderness, distal pulses intact bilaterally A&O x 3, cn 2-12 intact, no focal motor/sensory deficits.     -R shoulder x-ray with AC joint separation.  Bilateral wrist x-rays negative.  Patient given sling, splint applied to L hand.  Patient to follow-up with Ortho as outpatient.  Told to return to ER if he feels worse, or for any new/concerning symptoms.  Patient understands and agrees with plan.

## 2024-10-01 NOTE — ED PROVIDER NOTE - PATIENT PORTAL LINK FT
You can access the FollowMyHealth Patient Portal offered by [facility] by registering at the following website: [email or web address]. By joining [facility]’s FollowMyHealth portal, you will also be able to view your health information using other applications (apps) compatible with our system.

## 2024-10-02 ENCOUNTER — APPOINTMENT (OUTPATIENT)
Dept: ORTHOPEDIC SURGERY | Facility: CLINIC | Age: 54
End: 2024-10-02
Payer: COMMERCIAL

## 2024-10-02 VITALS — HEIGHT: 70 IN | WEIGHT: 205 LBS | BODY MASS INDEX: 29.35 KG/M2

## 2024-10-02 DIAGNOSIS — S43.101A UNSPECIFIED DISLOCATION OF RIGHT ACROMIOCLAVICULAR JOINT, INITIAL ENCOUNTER: ICD-10-CM

## 2024-10-02 PROCEDURE — 99213 OFFICE O/P EST LOW 20 MIN: CPT | Mod: 25

## 2024-10-02 RX ORDER — NABUMETONE 750 MG/1
750 TABLET, FILM COATED ORAL TWICE DAILY
Qty: 60 | Refills: 0 | Status: ACTIVE | COMMUNITY
Start: 2024-10-02 | End: 1900-01-01

## 2024-10-02 NOTE — DISCUSSION/SUMMARY
[de-identified] : Chief complaint: Right shoulder pain  HPI: Patient is a 53-year-old right-hand-dominant male who presents the office today for the evaluation of pain of the right shoulder.  Patient reports that he fell off his deck landing on his right shoulder yesterday.  He reports that the deck is approximately 4 feet off the ground.  Patient immediately experienced pain to the right shoulder.  He presented to [facility] at which time three-view x-rays of the right shoulder were performed with impression as per the radiologist of no evidence of an acute fracture however he was noted to have an AC joint separation.  Patient was placed in a sling and was told to follow-up with orthopedics.  Patient reports that he has pain to the right shoulder which she primarily localizes to the AC joint by pointing.  Reports taking Advil last night with minimal relief of his discomfort.  Denies any previous history of surgery or injury to the right shoulder.  ROS: Positive for right shoulder injury, pain  Physical examination of the right shoulder:  Edema noted to the anterior shoulder  No appreciable ecchymosis No erythema Distal end of the clavicle on the right appears more prominent when compared with the contralateral shoulder Skin is intact Tenderness to palpation over the right distal clavicle and over AC joint  no appreciable tenderness over glenohumeral joint, Lateral shoulder, posterior shoulder, scapula, midshaft of the clavicle, medial aspect of the clavicle Active range of motion to the right shoulder in forward flexion to approximately 90 degrees Active range of motion in horizontal abduction to approximately 100 degrees Full painless range of motion to the right elbow Full painless range of motion to the right wrist and hand Patient is able to make a full fist Distal sensation is grossly intact  patient presented to [facility] on 10/1/2024 at which time three-view x-rays of the right shoulder were performed with impression as per the radiologist: No evidence of an acute fracture.  AC separation.  Assessment/plan: Right AC joint separation, discussed treatment options with the patient  today the patient was provided with a right upper extremity sling I recommend that the patient remain in the sling at all times with the exception of for bathing, he can also come out of the sling 3-4 times a day with the shoulder immobilized and at his side to perform active range of motion of the right elbow to avoid stiffness/contracture development, I did recommend that he continue to perform active range of motion of the right wrist and hand throughout the day while in the sling to avoid stiffness/contracture development  patient can ice the right shoulder on an as-needed basis with sensory precautions  A prescription for nabumetone 750 mg as needed twice daily with food was sent to the patient's pharmacy, confirmed no contraindications to NSAIDS. Patient denies being on a blood thinner. Denies history of GIB or GI ulcer.  Discussed in detail with the patient that they cannot take over-the-counter NSAIDs including but not limited to ibuprofen, Advil, Aleve, or Motrin while taking this medication.  They can continue to take over-the-counter Tylenol.  Patient was provided with a prescription for formal physical therapy however I recommend that he not start therapy for approximately 2 weeks until after he is evaluated by either Dr. Michel or Dr. Keller  Patient will be provided with a 2-week follow-up with Dr. Michel or Dr. Keller for repeat evaluation, he verbalized understanding of all findings in the office today, he agrees to follow-up as directed

## 2024-10-16 ENCOUNTER — APPOINTMENT (OUTPATIENT)
Dept: ORTHOPEDIC SURGERY | Facility: CLINIC | Age: 54
End: 2024-10-16
Payer: COMMERCIAL

## 2024-10-16 PROCEDURE — 99202 OFFICE O/P NEW SF 15 MIN: CPT

## 2024-10-16 PROCEDURE — 73030 X-RAY EXAM OF SHOULDER: CPT | Mod: RT

## 2024-10-16 PROCEDURE — 73000 X-RAY EXAM OF COLLAR BONE: CPT | Mod: 50

## 2024-10-24 ENCOUNTER — APPOINTMENT (OUTPATIENT)
Dept: ORTHOPEDIC SURGERY | Facility: CLINIC | Age: 54
End: 2024-10-24
Payer: COMMERCIAL

## 2024-10-24 DIAGNOSIS — S43.101A UNSPECIFIED DISLOCATION OF RIGHT ACROMIOCLAVICULAR JOINT, INITIAL ENCOUNTER: ICD-10-CM

## 2024-10-24 PROCEDURE — 99203 OFFICE O/P NEW LOW 30 MIN: CPT

## 2024-10-30 ENCOUNTER — OUTPATIENT (OUTPATIENT)
Dept: OUTPATIENT SERVICES | Facility: HOSPITAL | Age: 54
LOS: 1 days | End: 2024-10-30
Payer: COMMERCIAL

## 2024-10-30 ENCOUNTER — RESULT REVIEW (OUTPATIENT)
Age: 54
End: 2024-10-30

## 2024-10-30 DIAGNOSIS — Z98.890 OTHER SPECIFIED POSTPROCEDURAL STATES: Chronic | ICD-10-CM

## 2024-10-30 DIAGNOSIS — E21.3 HYPERPARATHYROIDISM, UNSPECIFIED: ICD-10-CM

## 2024-10-30 DIAGNOSIS — Z00.8 ENCOUNTER FOR OTHER GENERAL EXAMINATION: ICD-10-CM

## 2024-10-30 PROCEDURE — 76536 US EXAM OF HEAD AND NECK: CPT | Mod: 26

## 2024-10-30 PROCEDURE — 76536 US EXAM OF HEAD AND NECK: CPT

## 2024-10-31 DIAGNOSIS — E21.3 HYPERPARATHYROIDISM, UNSPECIFIED: ICD-10-CM

## 2024-12-08 ENCOUNTER — EMERGENCY (EMERGENCY)
Facility: HOSPITAL | Age: 54
LOS: 0 days | Discharge: HOME | End: 2024-12-08
Attending: EMERGENCY MEDICINE
Payer: COMMERCIAL

## 2024-12-08 VITALS
WEIGHT: 199.96 LBS | SYSTOLIC BLOOD PRESSURE: 138 MMHG | DIASTOLIC BLOOD PRESSURE: 87 MMHG | OXYGEN SATURATION: 98 % | HEART RATE: 105 BPM | TEMPERATURE: 98 F | RESPIRATION RATE: 18 BRPM
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
## 2024-12-08 DIAGNOSIS — Z98.890 OTHER SPECIFIED POSTPROCEDURAL STATES: Chronic | ICD-10-CM

## 2024-12-08 DIAGNOSIS — M25.511 PAIN IN RIGHT SHOULDER: ICD-10-CM
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
## 2024-12-08 DIAGNOSIS — M25.551 PAIN IN RIGHT HIP: ICD-10-CM

## 2024-12-08 DIAGNOSIS — M25.521 PAIN IN RIGHT ELBOW: ICD-10-CM

## 2024-12-08 DIAGNOSIS — K21.9 GASTRO-ESOPHAGEAL REFLUX DISEASE WITHOUT ESOPHAGITIS: ICD-10-CM

## 2024-12-08 PROCEDURE — 99284 EMERGENCY DEPT VISIT MOD MDM: CPT

## 2024-12-08 PROCEDURE — 73502 X-RAY EXAM HIP UNI 2-3 VIEWS: CPT | Mod: RT

## 2024-12-08 PROCEDURE — 73030 X-RAY EXAM OF SHOULDER: CPT | Mod: 26,RT

## 2024-12-08 PROCEDURE — 99284 EMERGENCY DEPT VISIT MOD MDM: CPT | Mod: 25

## 2024-12-08 PROCEDURE — 73502 X-RAY EXAM HIP UNI 2-3 VIEWS: CPT | Mod: 26,RT

## 2024-12-08 PROCEDURE — 73030 X-RAY EXAM OF SHOULDER: CPT | Mod: RT

## 2024-12-08 RX ORDER — KETOROLAC TROMETHAMINE 30 MG/ML
15 INJECTION INTRAMUSCULAR; INTRAVENOUS ONCE
Refills: 0 | Status: DISCONTINUED | OUTPATIENT
Start: 2024-12-08 | End: 2024-12-08

## 2024-12-08 RX ORDER — METHOCARBAMOL 500 MG/1
1000 TABLET, FILM COATED ORAL ONCE
Refills: 0 | Status: COMPLETED | OUTPATIENT
Start: 2024-12-08 | End: 2024-12-08

## 2024-12-08 RX ORDER — ACETAMINOPHEN 500MG 500 MG/1
650 TABLET, COATED ORAL ONCE
Refills: 0 | Status: COMPLETED | OUTPATIENT
Start: 2024-12-08 | End: 2024-12-08

## 2024-12-08 RX ADMIN — ACETAMINOPHEN 500MG 650 MILLIGRAM(S): 500 TABLET, COATED ORAL at 21:45

## 2024-12-08 RX ADMIN — METHOCARBAMOL 1000 MILLIGRAM(S): 500 TABLET, FILM COATED ORAL at 21:46
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
## 2024-12-08 NOTE — ED PROVIDER NOTE - NS ED MD DISPO DISCHARGE CCDA
Arrived for rabies injection.  Alert and ambulatory.  Denies any changes in health or medications since last visit.  Tolerated injection.  Aware of next appointment.  Discharged home in care of self.   Patient/Caregiver provided printed discharge information.
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
## 2024-12-08 NOTE — ED PROVIDER NOTE - PHYSICAL EXAMINATION
CONST: Well appearing in NAD  EYES: EOMI, Sclera and conjunctiva clear.   NECK: Non-tender  CARD: Normal S1 S2; Normal rate and rhythm  RESP: Equal BS B/L, No wheezes, rhonchi or rales. No distress  GI: Soft, non-tender, non-distended. (-) seatbelt sign  MS: Normal ROM in all extremities. No midline spinal tenderness. TTP R low back, TTP R hip; TTP R shoulder  SKIN: Warm, dry, no acute rashes. Good turgor  NEURO: A&Ox3, No focal deficits. Strength 5/5 with no sensory deficits. Steady gait

## 2024-12-08 NOTE — ED PROVIDER NOTE - NSFOLLOWUPINSTRUCTIONS_ED_ALL_ED_FT
return for increased pain or failure to improved in 4-5 days  follow up with your doctor in 2-3 weeks

## 2024-12-08 NOTE — ED PROVIDER NOTE - WR INTERPRETED BY 2
Post-Care Instructions: Patient instructed to not lie down for 4 hours and limit physical activity for 24 hours. Patient instructed not to travel by airplane for 48 hours.\\n1. Try to exercise your treated muscles for 1-2 hours after treatment (e.g. practice frowning, raising your eyebrows or squinting). This helps to work [employer] into your muscles. Although this is\\nthought to help, it will NOT impact your treatment negatively if you forget to do this.\\n2. Do NOT rub or massage the treated areas for 2 hours after your treatment. Do NOT do strenuous exercise for 4 hours after treatment. Also avoid facials or saunas for 4 hours after your treatment. This will minimize the risk of raising your blood pressure and therefore minimize the risk of temporary bruising. Feel free to shower and go about most other regular daily activities.\\n3. Do NOT lie down for 4 hours after treatment. This is to avoid the risk of pressure on the treated areas (from your pillow) and to avoid the risk of having the area rubbed accidentally.\\n4. Be assured that any tiny bumps or marks will go away within a few hours. If you need to apply make-up within 4 hours after treatment, only use a GENTLE touch to avoid rubbing the treated area.\\n5. Results of your treatment may take up to 14 days to take full effect. Please wait until the 14 days has passed before assessing if you are pleased with the result.\\n6. MELITON Cutler needs to see you for a 2 week follow up assessment appointment. This will ensure MELITON Cutler is able to see how YOUR facial muscles reacted to your treatment. If you require more product to fine tune/adjust your treatment results, it will be applied during this appointment at an additional cost. For medical reasons, your results will be photographed and documented in your\\nconfidential patient file.\\n7. Because [employer] requires a special technique in order to customize the injections to your individual muscular structure, it is important that your muscle actively recovers BUT that your skin is not creasing to the point from where you started.\\n8. B[employer] is a temporary procedure and at first, you may find that your treatment results will last approximately 3-4\\nmonths. If you maintain your treatment appointments with the frequency recommended by MELITON Cutler, the duration of each\\ntreatment result may last longer than 4 months.\\n9. Initially MELITON Cutler sees her patients between the 3 months (12 week) and 4 months (16 week) time period. She is able to create\\nthe best clinical results for you during this period. If you allow [employer] to completely wear off, it is difficult for MELITON Cutler to be able to see how your individual muscles reacted and therefore optimal results for YOUR face can be more difficult to achieve.\\n10. MELITON Cutler will need to see you again in 3-4 month. Please ensure you schedule this appointment before you leave our office\\ntoday. Celso Warner

## 2024-12-08 NOTE — ED PROVIDER NOTE - PATIENT PORTAL LINK FT
You can access the FollowMyHealth Patient Portal offered by [facility] by registering at the following website: [email or web address]. By joining [employer]’s FollowMyHealth portal, you will also be able to view your health information using other applications (apps) compatible with our system.

## 2024-12-11 ENCOUNTER — EMERGENCY (EMERGENCY)
Facility: HOSPITAL | Age: 54
LOS: 0 days | Discharge: HOME | End: 2024-12-11
Attending: EMERGENCY MEDICINE

## 2024-12-11 VITALS
HEART RATE: 87 BPM | TEMPERATURE: 98 F | WEIGHT: 200.18 LBS | DIASTOLIC BLOOD PRESSURE: 97 MMHG | SYSTOLIC BLOOD PRESSURE: 151 MMHG | OXYGEN SATURATION: 99 % | RESPIRATION RATE: 20 BRPM

## 2024-12-11 DIAGNOSIS — Z98.890 OTHER SPECIFIED POSTPROCEDURAL STATES: Chronic | ICD-10-CM

## 2024-12-11 PROCEDURE — 99283 EMERGENCY DEPT VISIT LOW MDM: CPT

## 2024-12-11 PROCEDURE — 99284 EMERGENCY DEPT VISIT MOD MDM: CPT

## 2024-12-11 RX ORDER — METHOCARBAMOL 500 MG/1
2 TABLET, FILM COATED ORAL
Qty: 14 | Refills: 0
Start: 2024-12-11 | End: 2024-12-17

## 2024-12-11 NOTE — ED PROVIDER NOTE - PATIENT PORTAL LINK FT
You can access the FollowMyHealth Patient Portal offered by [facility] by registering at the following website: [email or web address]. By joining [employer]’s FollowMyHealth portal, you will also be able to view your health information using other applications (apps) compatible with our system.

## 2024-12-11 NOTE — ED PROVIDER NOTE - PHYSICAL EXAMINATION
GENERAL: Well-developed; well-nourished; in no acute distress.   SKIN: warm, dry, no skin changes   HEAD: Normocephalic; atraumatic.  EYES: PERRLA, EOMI, no conjunctival erythema  ENT: No nasal discharge; airway clear.  NECK: Supple; non tender.  EXT: R anterior shoulder tenderness, intact ROM   BACK: no midline tenderness or step-offs   NEURO: A/ox3, sensation intact, 5/5x4 strength, normal gait  PSYCH: Cooperative, appropriate.

## 2024-12-11 NOTE — ED PROVIDER NOTE - NSICDXFAMILYHX_GEN_ALL_CORE_FT
HISTORY OF PRESENTING ILLNESS      Kiko Lord is a 31 y.o. female new patient referred for possible tilt table testing. She reports intermittent episodes of shortness of breath, fatigue, ataxia and presyncope that occur when she is walking. She has only experienced one episode of syncope approximately 10 years ago that was unrelated to her current symptoms. Her urine is concentrated. Above symptoms started after fall which caused head trauma in January 2015 for which she presented to ER. She began experiencing symptoms of vertigo which then progressed to ataxia. All testing at the hospital at that time was normal. She also reports history of orthostatic hypotension. ACTIVE PROBLEM LIST     Patient Active Problem List    Diagnosis Date Noted    Shuffling gait 10/18/2016    Migraine with aura, without mention of intractable migraine without mention of status migrainosus 03/31/2015    Memory loss 03/31/2015    Ataxia 03/31/2015    Sleep apnea 03/31/2015    ADHD (attention deficit hyperactivity disorder) 03/31/2015    B12 deficiency 03/31/2015    Osteoporosis 03/31/2015    Dizziness 01/23/2015           PAST MEDICAL HISTORY     Past Medical History   Diagnosis Date    Depression     Hypothyroid     Hypothyroid     Insomnia     Migraine     Shuffling gait 10/18/2016     --intermittent when head was shaking/bobbing     Vertigo            PAST SURGICAL HISTORY     History reviewed. No pertinent past surgical history.        ALLERGIES     No Known Allergies       FAMILY HISTORY     Family History   Problem Relation Age of Onset    Heart Disease Maternal Grandfather     Cancer Paternal Grandmother     Heart Disease Paternal Grandmother     negative for cardiac disease       SOCIAL HISTORY     Social History     Social History    Marital status: SINGLE     Spouse name: N/A    Number of children: N/A    Years of education: N/A     Social History Main Topics    Smoking status: FAMILY HISTORY:  Father  Still living? Unknown  Family history of diabetes mellitus (DM), Age at diagnosis: Age Unknown  FH: prostate cancer, Age at diagnosis: Age Unknown     Never Smoker    Smokeless tobacco: None    Alcohol use Yes      Comment: rarely    Drug use: No    Sexual activity: Not Asked     Other Topics Concern    None     Social History Narrative         MEDICATIONS     Current Outpatient Prescriptions   Medication Sig    SYNTHROID 125 mcg tablet TAKE 1 TABLET BY MOUTH DAILY    ondansetron hcl (ZOFRAN, AS HYDROCHLORIDE,) 8 mg tablet Take 16 mg by mouth two (2) times a day.  nortriptyline (PAMELOR) 25 mg capsule Take  by mouth nightly.  Armodafinil (NUVIGIL) 250 mg tab tablet TAKE 1 TABLET BY MOUTH DAILY    ADDERALL XR 15 mg XR capsule Take 20 mg by mouth as needed.  CRYSELLE, 28, 0.3-30 mg-mcg per tablet     sertraline (ZOLOFT) 100 mg tablet Take 100 mg by mouth daily. No current facility-administered medications for this visit. I have reviewed the nurses notes, vitals, problem list, allergy list, medical history, family, social history and medications. REVIEW OF SYMPTOMS      General: Pt denies excessive weight gain or loss. Pt is able to conduct ADL's  HEENT: Denies blurred vision, headaches, hearing loss, epistaxis and difficulty swallowing. Respiratory: Denies cough, congestion, shortness of breath, WOODS, wheezing or stridor. Cardiovascular: Denies precordial pain, palpitations, edema or PND  Gastrointestinal: Denies poor appetite, indigestion, abdominal pain or blood in stool  Genitourinary: Denies hematuria, dysuria, increased urinary frequency  Musculoskeletal: Denies joint pain or swelling from muscles or joints  Neurologic: Denies tremor, paresthesias, headache, or sensory motor disturbance  Psychiatric: Denies confusion, insomnia, depression  Integumentray: Denies rash, itching or ulcers.   Hematologic: Denies easy bruising, bleeding     PHYSICAL EXAMINATION      Vitals:    02/08/17 1538   BP: 118/88   Pulse: 74   Resp: 16   SpO2: 100%   Weight: 148 lb (67.1 kg)   Height: 5' 7\" (1.702 m)     General: Well developed, in no acute distress. HEENT: No jaundice, oral mucosa moist, no oral ulcers  Neck: Supple, no stiffness, no lymphadenopathy, supple  Heart:  Normal S1/S2 negative S3 or S4. Regular, no murmur, gallop or rub, no jugular venous distention  Respiratory: Clear bilaterally x 4, no wheezing or rales  Abdomen:   Soft, non-tender, bowel sounds are active.   Extremities:  No edema, normal cap refill, no cyanosis. Musculoskeletal: No clubbing, no deformities  Neuro: A&Ox3, speech clear, gait stable, cooperative, no focal neurologic deficits  Skin: Skin color is normal. No rashes or lesions. Non diaphoretic, moist.  Vascular: 2+ pulses symmetric in all extremities       DIAGNOSTIC DATA      EKG:        LABORATORY DATA      Lab Results   Component Value Date/Time    WBC 4.9 10/18/2016 06:26 AM    HGB 11.9 10/18/2016 06:26 AM    HCT 36.6 10/18/2016 06:26 AM    PLATELET 983 90/74/7044 06:26 AM    MCV 88.4 10/18/2016 06:26 AM      Lab Results   Component Value Date/Time    Sodium 142 10/18/2016 06:26 AM    Potassium 3.7 10/18/2016 06:26 AM    Chloride 108 10/18/2016 06:26 AM    CO2 25 10/18/2016 06:26 AM    Anion gap 9 10/18/2016 06:26 AM    Glucose 87 10/18/2016 06:26 AM    BUN 9 10/18/2016 06:26 AM    Creatinine 0.68 10/18/2016 06:26 AM    BUN/Creatinine ratio 13 10/18/2016 06:26 AM    GFR est AA >60 10/18/2016 06:26 AM    GFR est non-AA >60 10/18/2016 06:26 AM    Calcium 7.8 10/18/2016 06:26 AM    Bilirubin, total 0.4 10/17/2016 04:49 PM    AST (SGOT) 18 10/17/2016 04:49 PM    Alk. phosphatase 34 10/17/2016 04:49 PM    Protein, total 8.1 10/17/2016 04:49 PM    Albumin 4.0 10/17/2016 04:49 PM    Globulin 4.1 10/17/2016 04:49 PM    A-G Ratio 1.0 10/17/2016 04:49 PM    ALT (SGPT) 22 10/17/2016 04:49 PM           ASSESSMENT      1. Dizziness  2. Presyncope  3. Ataxia        PLAN     Will arrange for tilt table test.      FOLLOW-UP     Follow up after testing completed.      Thank you,  Alaina Bello DO and Dr Eligio Harley for involving me in the care of this extraordinarily pleasant female. Please do not hesitate to contact me for further questions/concerns.      Written by Jackeline Guillen, as dictated by Enzo Vanessa MD.     Enzo Vanessa MD  Cardiac Electrophysiology / Cardiology    [address]  [address] [place] [address], [address]  [phone] / [phone] Fax   [phone] / [phone] Fax
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
## 2024-12-11 NOTE — ED PROVIDER NOTE - NSFOLLOWUPINSTRUCTIONS_ED_ALL_ED_FT
Follow up with your orthopedic surgeon for possible MRI if continued pain in shoulder or lower back.     Musculoskeletal Pain    Musculoskeletal pain is muscle and tay aches and pains. These pains can occur in any part of the body. Your caregiver may treat you without knowing the cause of the pain. They may treat you if blood or urine tests, X-rays, and other tests were normal.     CAUSES  There is often not a definite cause or reason for these pains. These pains may be caused by a type of germ (virus). The discomfort may also come from overuse. Overuse includes working out too hard when your body is not fit. Tay aches also come from weather changes. Bone is sensitive to atmospheric pressure changes.    HOME CARE INSTRUCTIONS  Ask when your test results will be ready. Make sure you get your test results.  Only take over-the-counter or prescription medicines for pain, discomfort, or fever as directed by your caregiver. If you were given medications for your condition, do not drive, operate machinery or power tools, or sign legal documents for 24 hours. Do not drink alcohol. Do not take sleeping pills or other medications that may interfere with treatment.  Continue all activities unless the activities cause more pain. When the pain lessens, slowly resume normal activities. Gradually increase the intensity and duration of the activities or exercise.   During periods of severe pain, bed rest may be helpful. Lay or sit in any position that is comfortable.   Putting ice on the injured area.  Put ice in a bag.   Place a towel between your skin and the bag.  Leave the ice on for 15 to 20 minutes, 3 to 4 times a day.   Follow up with your caregiver for continued problems and no reason can be found for the pain. If the pain becomes worse or does not go away, it may be necessary to repeat tests or do additional testing. Your caregiver may need to look further for a possible cause.    SEEK IMMEDIATE MEDICAL CARE IF:  You have pain that is getting worse and is not relieved by medications.  You develop chest pain that is associated with shortness or breath, sweating, feeling sick to your stomach (nauseous), or throw up (vomit).  Your pain becomes localized to the abdomen.  You develop any new symptoms that seem different or that concern you.    MAKE SURE YOU:  Understand these instructions.   Will watch your condition.  Will get help right away if you are not doing well or get worse.    ADDITIONAL NOTES AND INSTRUCTIONS    Please follow up with your Primary MD in 24-48 hr.  Seek immediate medical care for any new/worsening signs or symptoms.

## 2024-12-12 ENCOUNTER — APPOINTMENT (OUTPATIENT)
Dept: ORTHOPEDIC SURGERY | Facility: CLINIC | Age: 54
End: 2024-12-12
Payer: COMMERCIAL

## 2024-12-12 PROCEDURE — 99213 OFFICE O/P EST LOW 20 MIN: CPT | Mod: ACP

## 2024-12-19 ENCOUNTER — APPOINTMENT (OUTPATIENT)
Dept: ORTHOPEDIC SURGERY | Facility: CLINIC | Age: 54
End: 2024-12-19
Payer: COMMERCIAL

## 2024-12-19 DIAGNOSIS — S43.401A UNSPECIFIED SPRAIN OF RIGHT SHOULDER JOINT, INITIAL ENCOUNTER: ICD-10-CM

## 2024-12-19 PROCEDURE — 99213 OFFICE O/P EST LOW 20 MIN: CPT

## 2024-12-20 ENCOUNTER — APPOINTMENT (OUTPATIENT)
Dept: MRI IMAGING | Facility: CLINIC | Age: 54
End: 2024-12-20
Payer: COMMERCIAL

## 2024-12-20 PROCEDURE — 73221 MRI JOINT UPR EXTREM W/O DYE: CPT | Mod: RT

## 2024-12-23 ENCOUNTER — APPOINTMENT (OUTPATIENT)
Dept: UROLOGY | Facility: CLINIC | Age: 54
End: 2024-12-23

## 2025-01-06 ENCOUNTER — APPOINTMENT (OUTPATIENT)
Dept: NEPHROLOGY | Facility: CLINIC | Age: 55
End: 2025-01-06
Payer: COMMERCIAL

## 2025-01-06 VITALS
BODY MASS INDEX: 27.95 KG/M2 | HEART RATE: 76 BPM | OXYGEN SATURATION: 97 % | DIASTOLIC BLOOD PRESSURE: 90 MMHG | WEIGHT: 195.25 LBS | SYSTOLIC BLOOD PRESSURE: 140 MMHG | HEIGHT: 70 IN

## 2025-01-06 DIAGNOSIS — E04.1 NONTOXIC SINGLE THYROID NODULE: ICD-10-CM

## 2025-01-06 PROCEDURE — 99214 OFFICE O/P EST MOD 30 MIN: CPT

## 2025-01-09 ENCOUNTER — APPOINTMENT (OUTPATIENT)
Dept: ORTHOPEDIC SURGERY | Facility: CLINIC | Age: 55
End: 2025-01-09
Payer: COMMERCIAL

## 2025-01-09 DIAGNOSIS — S43.401A UNSPECIFIED SPRAIN OF RIGHT SHOULDER JOINT, INITIAL ENCOUNTER: ICD-10-CM

## 2025-01-09 PROCEDURE — 20611 DRAIN/INJ JOINT/BURSA W/US: CPT | Mod: RT

## 2025-01-09 PROCEDURE — 99214 OFFICE O/P EST MOD 30 MIN: CPT | Mod: 25

## 2025-01-23 ENCOUNTER — APPOINTMENT (OUTPATIENT)
Dept: ORTHOPEDIC SURGERY | Facility: CLINIC | Age: 55
End: 2025-01-23

## 2025-02-12 PROBLEM — Z12.11 ENCOUNTER FOR SCREENING COLONOSCOPY: Status: ACTIVE | Noted: 2025-02-12 | Resolved: 2025-02-26

## 2025-02-18 ENCOUNTER — NON-APPOINTMENT (OUTPATIENT)
Age: 55
End: 2025-02-18

## 2025-02-18 ENCOUNTER — APPOINTMENT (OUTPATIENT)
Dept: GASTROENTEROLOGY | Facility: CLINIC | Age: 55
End: 2025-02-18
Payer: COMMERCIAL

## 2025-02-18 VITALS — WEIGHT: 200 LBS | HEIGHT: 70 IN | BODY MASS INDEX: 28.63 KG/M2

## 2025-02-18 DIAGNOSIS — Z12.11 ENCOUNTER FOR SCREENING FOR MALIGNANT NEOPLASM OF COLON: ICD-10-CM

## 2025-02-18 DIAGNOSIS — R19.8 OTHER SPECIFIED SYMPTOMS AND SIGNS INVOLVING THE DIGESTIVE SYSTEM AND ABDOMEN: ICD-10-CM

## 2025-02-18 DIAGNOSIS — K62.5 HEMORRHAGE OF ANUS AND RECTUM: ICD-10-CM

## 2025-02-18 PROCEDURE — 99204 OFFICE O/P NEW MOD 45 MIN: CPT

## 2025-02-18 RX ORDER — SODIUM SULFATE, POTASSIUM SULFATE AND MAGNESIUM SULFATE 1.6; 3.13; 17.5 G/177ML; G/177ML; G/177ML
17.5-3.13-1.6 SOLUTION ORAL
Qty: 1 | Refills: 0 | Status: ACTIVE | COMMUNITY
Start: 2025-02-18 | End: 1900-01-01

## 2025-02-18 RX ORDER — TAMSULOSIN HYDROCHLORIDE 0.4 MG/1
0.4 CAPSULE ORAL
Refills: 0 | Status: ACTIVE | COMMUNITY

## 2025-02-20 ENCOUNTER — APPOINTMENT (OUTPATIENT)
Dept: ORTHOPEDIC SURGERY | Facility: CLINIC | Age: 55
End: 2025-02-20
Payer: COMMERCIAL

## 2025-02-20 DIAGNOSIS — S43.401A UNSPECIFIED SPRAIN OF RIGHT SHOULDER JOINT, INITIAL ENCOUNTER: ICD-10-CM

## 2025-02-20 PROCEDURE — 99214 OFFICE O/P EST MOD 30 MIN: CPT

## 2025-05-05 ENCOUNTER — TRANSCRIPTION ENCOUNTER (OUTPATIENT)
Age: 55
End: 2025-05-05

## 2025-05-05 ENCOUNTER — RESULT REVIEW (OUTPATIENT)
Age: 55
End: 2025-05-05

## 2025-05-05 ENCOUNTER — OUTPATIENT (OUTPATIENT)
Dept: OUTPATIENT SERVICES | Facility: HOSPITAL | Age: 55
LOS: 1 days | Discharge: HOME | End: 2025-05-05
Payer: MEDICAID

## 2025-05-05 VITALS
OXYGEN SATURATION: 97 % | HEART RATE: 53 BPM | RESPIRATION RATE: 17 BRPM | WEIGHT: 199.96 LBS | TEMPERATURE: 98 F | DIASTOLIC BLOOD PRESSURE: 79 MMHG | SYSTOLIC BLOOD PRESSURE: 124 MMHG | HEIGHT: 70 IN

## 2025-05-05 VITALS
DIASTOLIC BLOOD PRESSURE: 67 MMHG | HEART RATE: 62 BPM | RESPIRATION RATE: 17 BRPM | SYSTOLIC BLOOD PRESSURE: 119 MMHG | TEMPERATURE: 97 F | OXYGEN SATURATION: 97 %

## 2025-05-05 DIAGNOSIS — Z98.890 OTHER SPECIFIED POSTPROCEDURAL STATES: Chronic | ICD-10-CM

## 2025-05-05 DIAGNOSIS — K62.5 HEMORRHAGE OF ANUS AND RECTUM: ICD-10-CM

## 2025-05-05 DIAGNOSIS — Z12.11 ENCOUNTER FOR SCREENING FOR MALIGNANT NEOPLASM OF COLON: ICD-10-CM

## 2025-05-05 PROCEDURE — 88305 TISSUE EXAM BY PATHOLOGIST: CPT | Mod: 26

## 2025-05-05 PROCEDURE — 45380 COLONOSCOPY AND BIOPSY: CPT

## 2025-05-05 PROCEDURE — 88305 TISSUE EXAM BY PATHOLOGIST: CPT

## 2025-05-05 NOTE — ASU DISCHARGE PLAN (ADULT/PEDIATRIC) - CARE PROVIDER_API CALL
Rayna Ott  Gastroenterology  [address] [place] [address]  Phone: [phone]  Fax: [phone]  Follow Up Time:

## 2025-05-05 NOTE — ASU DISCHARGE PLAN (ADULT/PEDIATRIC) - FINANCIAL ASSISTANCE
[facility] provides services at a reduced cost to those who are determined to be eligible through [facility]’s financial assistance program. Information regarding [facility]’s financial assistance program can be found by going to [email or web address] or by calling [phone].

## 2025-05-06 LAB — SURGICAL PATHOLOGY STUDY: SIGNIFICANT CHANGE UP

## 2025-05-08 DIAGNOSIS — K21.9 GASTRO-ESOPHAGEAL REFLUX DISEASE WITHOUT ESOPHAGITIS: ICD-10-CM

## 2025-05-08 DIAGNOSIS — K63.5 POLYP OF COLON: ICD-10-CM

## 2025-05-08 DIAGNOSIS — K64.4 RESIDUAL HEMORRHOIDAL SKIN TAGS: ICD-10-CM

## 2025-05-08 DIAGNOSIS — Z12.11 ENCOUNTER FOR SCREENING FOR MALIGNANT NEOPLASM OF COLON: ICD-10-CM

## 2025-05-08 DIAGNOSIS — K62.1 RECTAL POLYP: ICD-10-CM

## 2025-05-08 DIAGNOSIS — Z87.891 PERSONAL HISTORY OF NICOTINE DEPENDENCE: ICD-10-CM

## 2025-05-08 DIAGNOSIS — Z91.018 ALLERGY TO OTHER FOODS: ICD-10-CM

## 2025-05-08 DIAGNOSIS — Z91.010 ALLERGY TO PEANUTS: ICD-10-CM

## 2025-05-20 ENCOUNTER — OUTPATIENT (OUTPATIENT)
Dept: OUTPATIENT SERVICES | Facility: HOSPITAL | Age: 55
LOS: 1 days | End: 2025-05-20
Payer: COMMERCIAL

## 2025-05-20 DIAGNOSIS — Z98.890 OTHER SPECIFIED POSTPROCEDURAL STATES: Chronic | ICD-10-CM

## 2025-05-20 DIAGNOSIS — M25.551 PAIN IN RIGHT HIP: ICD-10-CM

## 2025-05-20 DIAGNOSIS — Z00.8 ENCOUNTER FOR OTHER GENERAL EXAMINATION: ICD-10-CM

## 2025-05-20 PROCEDURE — 73721 MRI JNT OF LWR EXTRE W/O DYE: CPT | Mod: RT

## 2025-05-20 PROCEDURE — 73721 MRI JNT OF LWR EXTRE W/O DYE: CPT | Mod: 26,RT

## 2025-05-21 DIAGNOSIS — M25.551 PAIN IN RIGHT HIP: ICD-10-CM

## 2025-06-17 ENCOUNTER — APPOINTMENT (OUTPATIENT)
Dept: ORTHOPEDIC SURGERY | Facility: CLINIC | Age: 55
End: 2025-06-17
Payer: MEDICAID

## 2025-06-17 PROCEDURE — 99213 OFFICE O/P EST LOW 20 MIN: CPT

## 2025-06-20 ENCOUNTER — APPOINTMENT (OUTPATIENT)
Dept: PAIN MANAGEMENT | Facility: CLINIC | Age: 55
End: 2025-06-20
Payer: MEDICAID

## 2025-06-20 VITALS — WEIGHT: 200 LBS | BODY MASS INDEX: 28.63 KG/M2 | HEIGHT: 70 IN

## 2025-06-20 PROCEDURE — 99204 OFFICE O/P NEW MOD 45 MIN: CPT

## 2025-07-02 ENCOUNTER — APPOINTMENT (OUTPATIENT)
Dept: NEPHROLOGY | Facility: CLINIC | Age: 55
End: 2025-07-02
Payer: MEDICAID

## 2025-07-02 VITALS
DIASTOLIC BLOOD PRESSURE: 86 MMHG | OXYGEN SATURATION: 99 % | WEIGHT: 206 LBS | HEIGHT: 70 IN | HEART RATE: 78 BPM | BODY MASS INDEX: 29.49 KG/M2 | SYSTOLIC BLOOD PRESSURE: 120 MMHG

## 2025-07-02 PROBLEM — R39.9 LOWER URINARY TRACT SYMPTOMS (LUTS): Status: ACTIVE | Noted: 2025-07-02

## 2025-07-02 PROCEDURE — 99214 OFFICE O/P EST MOD 30 MIN: CPT

## 2025-07-02 PROCEDURE — G2211 COMPLEX E/M VISIT ADD ON: CPT | Mod: NC

## 2025-07-02 RX ORDER — MULTIVIT-MIN/IRON/FOLIC ACID/K 18-600-40
50 MCG CAPSULE ORAL
Qty: 90 | Refills: 2 | Status: ACTIVE | COMMUNITY
Start: 2025-07-02 | End: 1900-01-01

## 2025-08-11 ENCOUNTER — APPOINTMENT (OUTPATIENT)
Dept: PAIN MANAGEMENT | Facility: CLINIC | Age: 55
End: 2025-08-11
Payer: MEDICAID

## 2025-08-11 DIAGNOSIS — M16.11 UNILATERAL PRIMARY OSTEOARTHRITIS, RIGHT HIP: ICD-10-CM

## 2025-08-11 DIAGNOSIS — M25.551 PAIN IN RIGHT HIP: ICD-10-CM

## 2025-08-11 PROCEDURE — 20610 DRAIN/INJ JOINT/BURSA W/O US: CPT | Mod: RT

## 2025-08-11 PROCEDURE — 77002 NEEDLE LOCALIZATION BY XRAY: CPT

## 2025-08-20 ENCOUNTER — APPOINTMENT (OUTPATIENT)
Dept: UROLOGY | Facility: CLINIC | Age: 55
End: 2025-08-20

## 2025-08-22 ENCOUNTER — APPOINTMENT (OUTPATIENT)
Dept: PAIN MANAGEMENT | Facility: CLINIC | Age: 55
End: 2025-08-22
Payer: MEDICAID

## 2025-08-22 DIAGNOSIS — M54.50 LOW BACK PAIN, UNSPECIFIED: ICD-10-CM

## 2025-08-22 PROCEDURE — 99214 OFFICE O/P EST MOD 30 MIN: CPT

## 2025-08-22 RX ORDER — MELOXICAM 15 MG/1
15 TABLET ORAL
Qty: 30 | Refills: 2 | Status: ACTIVE | COMMUNITY
Start: 2025-08-22 | End: 1900-01-01

## 2025-08-25 ENCOUNTER — APPOINTMENT (OUTPATIENT)
Dept: ORTHOPEDIC SURGERY | Facility: AMBULATORY SURGERY CENTER | Age: 55
End: 2025-08-25

## 2025-09-02 ENCOUNTER — APPOINTMENT (OUTPATIENT)
Dept: ORTHOPEDIC SURGERY | Facility: CLINIC | Age: 55
End: 2025-09-02

## 2025-09-18 ENCOUNTER — RX RENEWAL (OUTPATIENT)
Age: 55
End: 2025-09-18